# Patient Record
Sex: MALE | Race: WHITE | Employment: UNEMPLOYED | URBAN - METROPOLITAN AREA
[De-identification: names, ages, dates, MRNs, and addresses within clinical notes are randomized per-mention and may not be internally consistent; named-entity substitution may affect disease eponyms.]

---

## 2016-11-18 LAB
AVERAGE GLUCOSE: NORMAL
CREATININE, URINE: NORMAL
HBA1C MFR BLD: 7.1 %
MICROALBUMIN/CREAT 24H UR: NORMAL MG/G{CREAT}
MICROALBUMIN/CREAT UR-RTO: NORMAL

## 2018-07-03 LAB — POTASSIUM SERPL-SCNC: 4.6 MMOL/L (ref 3.5–5.1)

## 2018-10-28 ENCOUNTER — APPOINTMENT (OUTPATIENT)
Dept: GENERAL RADIOLOGY | Age: 58
DRG: 542 | End: 2018-10-28
Payer: MEDICARE

## 2018-10-28 ENCOUNTER — HOSPITAL ENCOUNTER (INPATIENT)
Age: 58
LOS: 5 days | Discharge: NURSING, REHAB OR LONG-TERM CARE | DRG: 542 | End: 2018-11-02
Attending: EMERGENCY MEDICINE | Admitting: INTERNAL MEDICINE
Payer: MEDICARE

## 2018-10-28 DIAGNOSIS — J18.9 PNEUMONIA DUE TO ORGANISM: ICD-10-CM

## 2018-10-28 DIAGNOSIS — J44.1 COPD EXACERBATION (HCC): Primary | ICD-10-CM

## 2018-10-28 DIAGNOSIS — R09.02 HYPOXIA: ICD-10-CM

## 2018-10-28 DIAGNOSIS — S32.018A OTHER CLOSED FRACTURE OF FIRST LUMBAR VERTEBRA, INITIAL ENCOUNTER (HCC): ICD-10-CM

## 2018-10-28 DIAGNOSIS — R00.0 TACHYCARDIA: ICD-10-CM

## 2018-10-28 PROBLEM — R06.02 SHORTNESS OF BREATH: Status: ACTIVE | Noted: 2018-10-28

## 2018-10-28 LAB
ALBUMIN SERPL-MCNC: 4 G/DL (ref 3.9–4.9)
ALP BLD-CCNC: 90 U/L (ref 35–104)
ALT SERPL-CCNC: 21 U/L (ref 0–41)
AMPHETAMINE SCREEN, URINE: NORMAL
ANION GAP SERPL CALCULATED.3IONS-SCNC: 13 MEQ/L (ref 7–13)
AST SERPL-CCNC: 45 U/L (ref 0–40)
BARBITURATE SCREEN URINE: NORMAL
BASOPHILS ABSOLUTE: 0 K/UL (ref 0–0.2)
BASOPHILS RELATIVE PERCENT: 0.3 %
BENZODIAZEPINE SCREEN, URINE: NORMAL
BILIRUB SERPL-MCNC: 0.5 MG/DL (ref 0–1.2)
BILIRUBIN URINE: NEGATIVE
BLOOD, URINE: NEGATIVE
BUN BLDV-MCNC: 17 MG/DL (ref 6–20)
CALCIUM SERPL-MCNC: 8.9 MG/DL (ref 8.6–10.2)
CANNABINOID SCREEN URINE: NORMAL
CHLORIDE BLD-SCNC: 89 MEQ/L (ref 98–107)
CLARITY: CLEAR
CO2: 30 MEQ/L (ref 22–29)
COCAINE METABOLITE SCREEN URINE: NORMAL
COLOR: YELLOW
CREAT SERPL-MCNC: 0.89 MG/DL (ref 0.7–1.2)
D DIMER: 0.3 MG/L FEU (ref 0–0.5)
EOSINOPHILS ABSOLUTE: 0 K/UL (ref 0–0.7)
EOSINOPHILS RELATIVE PERCENT: 0 %
ETHANOL PERCENT: NORMAL G/DL
ETHANOL: <10 MG/DL (ref 0–0.08)
GFR AFRICAN AMERICAN: >60
GFR NON-AFRICAN AMERICAN: >60
GLOBULIN: 2.5 G/DL (ref 2.3–3.5)
GLUCOSE BLD-MCNC: 287 MG/DL (ref 60–115)
GLUCOSE BLD-MCNC: 290 MG/DL (ref 60–115)
GLUCOSE BLD-MCNC: 341 MG/DL (ref 74–109)
GLUCOSE URINE: >=1000 MG/DL
HBA1C MFR BLD: 8.9 % (ref 4.8–5.9)
HCT VFR BLD CALC: 48 % (ref 42–52)
HEMOGLOBIN: 16.8 G/DL (ref 14–18)
KETONES, URINE: 15 MG/DL
LACTIC ACID: 2.2 MMOL/L (ref 0.5–2.2)
LEUKOCYTE ESTERASE, URINE: NEGATIVE
LYMPHOCYTES ABSOLUTE: 1.3 K/UL (ref 1–4.8)
LYMPHOCYTES RELATIVE PERCENT: 12.4 %
Lab: NORMAL
MAGNESIUM: 1.8 MG/DL (ref 1.7–2.3)
MCH RBC QN AUTO: 33.1 PG (ref 27–31.3)
MCHC RBC AUTO-ENTMCNC: 35 % (ref 33–37)
MCV RBC AUTO: 94.6 FL (ref 80–100)
MONOCYTES ABSOLUTE: 0.6 K/UL (ref 0.2–0.8)
MONOCYTES RELATIVE PERCENT: 5.4 %
NEUTROPHILS ABSOLUTE: 8.4 K/UL (ref 1.4–6.5)
NEUTROPHILS RELATIVE PERCENT: 81.9 %
NITRITE, URINE: NEGATIVE
OPIATE SCREEN URINE: NORMAL
PDW BLD-RTO: 14.2 % (ref 11.5–14.5)
PERFORMED ON: ABNORMAL
PERFORMED ON: ABNORMAL
PH UA: 5 (ref 5–9)
PHENCYCLIDINE SCREEN URINE: NORMAL
PLATELET # BLD: 153 K/UL (ref 130–400)
POTASSIUM SERPL-SCNC: 4.3 MEQ/L (ref 3.5–5.1)
PROTEIN UA: ABNORMAL MG/DL
RBC # BLD: 5.08 M/UL (ref 4.7–6.1)
SODIUM BLD-SCNC: 132 MEQ/L (ref 132–144)
SPECIFIC GRAVITY UA: 1.02 (ref 1–1.03)
TOTAL PROTEIN: 6.5 G/DL (ref 6.4–8.1)
TROPONIN: <0.01 NG/ML (ref 0–0.01)
URINE REFLEX TO CULTURE: ABNORMAL
UROBILINOGEN, URINE: 0.2 E.U./DL
WBC # BLD: 10.2 K/UL (ref 4.8–10.8)

## 2018-10-28 PROCEDURE — G0480 DRUG TEST DEF 1-7 CLASSES: HCPCS

## 2018-10-28 PROCEDURE — 99285 EMERGENCY DEPT VISIT HI MDM: CPT

## 2018-10-28 PROCEDURE — 1210000000 HC MED SURG R&B

## 2018-10-28 PROCEDURE — 6360000002 HC RX W HCPCS: Performed by: NURSE PRACTITIONER

## 2018-10-28 PROCEDURE — 94760 N-INVAS EAR/PLS OXIMETRY 1: CPT

## 2018-10-28 PROCEDURE — 96375 TX/PRO/DX INJ NEW DRUG ADDON: CPT

## 2018-10-28 PROCEDURE — 6370000000 HC RX 637 (ALT 250 FOR IP): Performed by: EMERGENCY MEDICINE

## 2018-10-28 PROCEDURE — 0HBRXZZ EXCISION OF TOE NAIL, EXTERNAL APPROACH: ICD-10-PCS | Performed by: INTERNAL MEDICINE

## 2018-10-28 PROCEDURE — 83036 HEMOGLOBIN GLYCOSYLATED A1C: CPT

## 2018-10-28 PROCEDURE — 80053 COMPREHEN METABOLIC PANEL: CPT

## 2018-10-28 PROCEDURE — 71045 X-RAY EXAM CHEST 1 VIEW: CPT

## 2018-10-28 PROCEDURE — 83605 ASSAY OF LACTIC ACID: CPT

## 2018-10-28 PROCEDURE — 2580000003 HC RX 258: Performed by: NURSE PRACTITIONER

## 2018-10-28 PROCEDURE — 6370000000 HC RX 637 (ALT 250 FOR IP): Performed by: NURSE PRACTITIONER

## 2018-10-28 PROCEDURE — 36415 COLL VENOUS BLD VENIPUNCTURE: CPT

## 2018-10-28 PROCEDURE — 72100 X-RAY EXAM L-S SPINE 2/3 VWS: CPT

## 2018-10-28 PROCEDURE — 72040 X-RAY EXAM NECK SPINE 2-3 VW: CPT

## 2018-10-28 PROCEDURE — 6360000002 HC RX W HCPCS: Performed by: EMERGENCY MEDICINE

## 2018-10-28 PROCEDURE — 94640 AIRWAY INHALATION TREATMENT: CPT

## 2018-10-28 PROCEDURE — 85025 COMPLETE CBC W/AUTO DIFF WBC: CPT

## 2018-10-28 PROCEDURE — 80307 DRUG TEST PRSMV CHEM ANLYZR: CPT

## 2018-10-28 PROCEDURE — 84484 ASSAY OF TROPONIN QUANT: CPT

## 2018-10-28 PROCEDURE — 81003 URINALYSIS AUTO W/O SCOPE: CPT

## 2018-10-28 PROCEDURE — 6360000002 HC RX W HCPCS: Performed by: INTERNAL MEDICINE

## 2018-10-28 PROCEDURE — 2580000003 HC RX 258: Performed by: EMERGENCY MEDICINE

## 2018-10-28 PROCEDURE — 72072 X-RAY EXAM THORAC SPINE 3VWS: CPT

## 2018-10-28 PROCEDURE — 87040 BLOOD CULTURE FOR BACTERIA: CPT

## 2018-10-28 PROCEDURE — 83735 ASSAY OF MAGNESIUM: CPT

## 2018-10-28 PROCEDURE — 94664 DEMO&/EVAL PT USE INHALER: CPT

## 2018-10-28 PROCEDURE — 2700000000 HC OXYGEN THERAPY PER DAY

## 2018-10-28 PROCEDURE — 96365 THER/PROPH/DIAG IV INF INIT: CPT

## 2018-10-28 PROCEDURE — 85379 FIBRIN DEGRADATION QUANT: CPT

## 2018-10-28 RX ORDER — LIDOCAINE 4 G/G
1 PATCH TOPICAL DAILY
Status: DISCONTINUED | OUTPATIENT
Start: 2018-10-28 | End: 2018-10-28

## 2018-10-28 RX ORDER — ALBUTEROL SULFATE 2.5 MG/3ML
2.5 SOLUTION RESPIRATORY (INHALATION) 3 TIMES DAILY
Status: DISCONTINUED | OUTPATIENT
Start: 2018-10-28 | End: 2018-10-29

## 2018-10-28 RX ORDER — DEXTROSE MONOHYDRATE 50 MG/ML
100 INJECTION, SOLUTION INTRAVENOUS PRN
Status: DISCONTINUED | OUTPATIENT
Start: 2018-10-28 | End: 2018-11-02 | Stop reason: HOSPADM

## 2018-10-28 RX ORDER — ONDANSETRON 2 MG/ML
4 INJECTION INTRAMUSCULAR; INTRAVENOUS EVERY 6 HOURS PRN
Status: DISCONTINUED | OUTPATIENT
Start: 2018-10-28 | End: 2018-11-02 | Stop reason: HOSPADM

## 2018-10-28 RX ORDER — ALBUTEROL SULFATE 2.5 MG/3ML
2.5 SOLUTION RESPIRATORY (INHALATION)
Status: DISCONTINUED | OUTPATIENT
Start: 2018-10-28 | End: 2018-11-02 | Stop reason: HOSPADM

## 2018-10-28 RX ORDER — SODIUM CHLORIDE 9 MG/ML
INJECTION, SOLUTION INTRAVENOUS CONTINUOUS
Status: DISCONTINUED | OUTPATIENT
Start: 2018-10-28 | End: 2018-11-02 | Stop reason: HOSPADM

## 2018-10-28 RX ORDER — ALBUTEROL SULFATE 2.5 MG/3ML
2.5 SOLUTION RESPIRATORY (INHALATION) EVERY 6 HOURS
Status: DISCONTINUED | OUTPATIENT
Start: 2018-10-28 | End: 2018-10-28

## 2018-10-28 RX ORDER — NICOTINE 21 MG/24HR
1 PATCH, TRANSDERMAL 24 HOURS TRANSDERMAL DAILY
Status: DISCONTINUED | OUTPATIENT
Start: 2018-10-28 | End: 2018-11-02 | Stop reason: HOSPADM

## 2018-10-28 RX ORDER — NICOTINE POLACRILEX 4 MG
15 LOZENGE BUCCAL PRN
Status: DISCONTINUED | OUTPATIENT
Start: 2018-10-28 | End: 2018-11-02 | Stop reason: HOSPADM

## 2018-10-28 RX ORDER — LEVOFLOXACIN 500 MG/1
500 TABLET, FILM COATED ORAL DAILY
Status: DISCONTINUED | OUTPATIENT
Start: 2018-10-29 | End: 2018-10-29

## 2018-10-28 RX ORDER — DEXTROSE MONOHYDRATE 25 G/50ML
12.5 INJECTION, SOLUTION INTRAVENOUS PRN
Status: DISCONTINUED | OUTPATIENT
Start: 2018-10-28 | End: 2018-11-02 | Stop reason: HOSPADM

## 2018-10-28 RX ORDER — IBUPROFEN 400 MG/1
400 TABLET ORAL EVERY 6 HOURS PRN
Status: DISCONTINUED | OUTPATIENT
Start: 2018-10-28 | End: 2018-11-02 | Stop reason: HOSPADM

## 2018-10-28 RX ORDER — PREDNISONE 20 MG/1
40 TABLET ORAL DAILY
Status: DISCONTINUED | OUTPATIENT
Start: 2018-10-28 | End: 2018-10-28

## 2018-10-28 RX ORDER — SODIUM CHLORIDE 0.9 % (FLUSH) 0.9 %
10 SYRINGE (ML) INJECTION PRN
Status: DISCONTINUED | OUTPATIENT
Start: 2018-10-28 | End: 2018-11-02 | Stop reason: HOSPADM

## 2018-10-28 RX ORDER — TRAMADOL HYDROCHLORIDE 50 MG/1
50 TABLET ORAL EVERY 6 HOURS PRN
Status: DISCONTINUED | OUTPATIENT
Start: 2018-10-28 | End: 2018-10-28

## 2018-10-28 RX ORDER — SODIUM CHLORIDE 0.9 % (FLUSH) 0.9 %
10 SYRINGE (ML) INJECTION EVERY 12 HOURS SCHEDULED
Status: DISCONTINUED | OUTPATIENT
Start: 2018-10-28 | End: 2018-11-02 | Stop reason: HOSPADM

## 2018-10-28 RX ORDER — 0.9 % SODIUM CHLORIDE 0.9 %
1000 INTRAVENOUS SOLUTION INTRAVENOUS ONCE
Status: COMPLETED | OUTPATIENT
Start: 2018-10-28 | End: 2018-10-28

## 2018-10-28 RX ORDER — IPRATROPIUM BROMIDE AND ALBUTEROL SULFATE 2.5; .5 MG/3ML; MG/3ML
1 SOLUTION RESPIRATORY (INHALATION) PRN
Status: DISCONTINUED | OUTPATIENT
Start: 2018-10-28 | End: 2018-10-28

## 2018-10-28 RX ORDER — PREDNISONE 20 MG/1
40 TABLET ORAL DAILY
Status: DISCONTINUED | OUTPATIENT
Start: 2018-10-29 | End: 2018-10-29

## 2018-10-28 RX ORDER — ALBUTEROL SULFATE 2.5 MG/3ML
2.5 SOLUTION RESPIRATORY (INHALATION) EVERY 6 HOURS PRN
Status: DISCONTINUED | OUTPATIENT
Start: 2018-10-28 | End: 2018-10-28

## 2018-10-28 RX ORDER — LEVOFLOXACIN 5 MG/ML
750 INJECTION, SOLUTION INTRAVENOUS ONCE
Status: COMPLETED | OUTPATIENT
Start: 2018-10-28 | End: 2018-10-28

## 2018-10-28 RX ORDER — METHYLPREDNISOLONE SODIUM SUCCINATE 125 MG/2ML
125 INJECTION, POWDER, LYOPHILIZED, FOR SOLUTION INTRAMUSCULAR; INTRAVENOUS ONCE
Status: COMPLETED | OUTPATIENT
Start: 2018-10-28 | End: 2018-10-28

## 2018-10-28 RX ADMIN — LEVOFLOXACIN 750 MG: 5 INJECTION, SOLUTION INTRAVENOUS at 13:19

## 2018-10-28 RX ADMIN — ALBUTEROL SULFATE 2.5 MG: 2.5 SOLUTION RESPIRATORY (INHALATION) at 15:07

## 2018-10-28 RX ADMIN — IPRATROPIUM BROMIDE AND ALBUTEROL SULFATE 1 AMPULE: .5; 3 SOLUTION RESPIRATORY (INHALATION) at 12:18

## 2018-10-28 RX ADMIN — METHYLPREDNISOLONE SODIUM SUCCINATE 125 MG: 125 INJECTION, POWDER, FOR SOLUTION INTRAMUSCULAR; INTRAVENOUS at 12:46

## 2018-10-28 RX ADMIN — ENOXAPARIN SODIUM 40 MG: 40 INJECTION SUBCUTANEOUS at 15:29

## 2018-10-28 RX ADMIN — SODIUM CHLORIDE 1000 ML: 9 INJECTION, SOLUTION INTRAVENOUS at 12:50

## 2018-10-28 RX ADMIN — IBUPROFEN 400 MG: 400 TABLET ORAL at 21:43

## 2018-10-28 RX ADMIN — INSULIN LISPRO 3 UNITS: 100 INJECTION, SOLUTION INTRAVENOUS; SUBCUTANEOUS at 17:17

## 2018-10-28 RX ADMIN — SODIUM CHLORIDE: 9 INJECTION, SOLUTION INTRAVENOUS at 15:29

## 2018-10-28 RX ADMIN — ALBUTEROL SULFATE 2.5 MG: 2.5 SOLUTION RESPIRATORY (INHALATION) at 20:54

## 2018-10-28 ASSESSMENT — PAIN DESCRIPTION - LOCATION: LOCATION: BACK

## 2018-10-28 ASSESSMENT — PAIN SCALES - GENERAL
PAINLEVEL_OUTOF10: 8
PAINLEVEL_OUTOF10: 2
PAINLEVEL_OUTOF10: 0

## 2018-10-28 ASSESSMENT — PAIN DESCRIPTION - ORIENTATION: ORIENTATION: RIGHT;MID

## 2018-10-28 ASSESSMENT — PAIN DESCRIPTION - PAIN TYPE: TYPE: CHRONIC PAIN

## 2018-10-28 ASSESSMENT — PAIN DESCRIPTION - DESCRIPTORS: DESCRIPTORS: SHARP

## 2018-10-28 NOTE — PROGRESS NOTES
[facility] AT [place] Respiratory Therapy Evaluation   Current Order:  Q6 albuterol     Home Regimen: prn albuterol     Ordering Physician:Tobin  Re-evaluation Date:  10/31    Diagnosis: SOB     Patient Status: Stable / Unstable + Physician notified    The following MDI Criteria must be met in order to convert aerosol to MDI with spacer. If unable to meet, MDI will be converted to aerosol:  []  Patient able to demonstrate the ability to use MDI effectively  []  Patient alert and cooperative  []  Patient able to take deep breath with 5-10 second hold  []  Medication(s) available in this delivery method   []  Peak flow greater than or equal to 200 ml/min            Current Order Substituted To  (same drug, same frequency)   Aerosol to MDI [] Albuterol Sulfate 0.083% unit dose by aerosol Albuterol Sulfate MDI 2 puffs by inhalation with spacer    [] Levalbuterol 1.25 mg unit dose by aerosol Levalbuterol MDI 2 puffs by inhalation with spacer    [] Levalbuterol 0.63 mg unit dose by aerosol Levalbuterol MDI 2 puffs by inhalation with spacer    [] Ipratropium Bromide 0.02% unit dose by aerosol Ipratropium Bromide MDI 2 puffs by inhalation with spacer    [] Duoneb (Ipratropium + Albuterol) unit dose by aerosol Ipratropium MDI + Albuterol MDI 2 puffs by inhalation w/spacer   MDI to Aerosol [] Albuterol Sulfate MDI Albuterol Sulfate 0.083% unit dose by aerosol    [] Levalbuterol MDI 2 puffs by inhalation Levalbuterol 1.25 mg unit dose by aerosol    [] Ipratropium Bromide MDI by inhalation Ipratropium Bromide 0.02% unit dose by aerosol    [] Combivent (Ipratropium + Albuterol) MDI by inhalation Duoneb (Ipratropium + Albuterol) unit dose by aerosol   Treatment Assessment [Frequency/Schedule]:  Change frequency to: __________TID albuterol________________________________________per Protocol, P&T, MEC      Points 0 1 2 3 4   Pulmonary Status  Non-Smoker  []   Smoking history   < 20 pack years  []   Smoking history  ?  20 pack years  [] Pulmonary Disorder  (acute or chronic)  [x]   Severe or Chronic w/ Exacerbation  []     Surgical Status No [x]   Surgeries     General []   Surgery Lower []   Abdominal Thoracic or []   Upper Abdominal Thoracic with  PulmonaryDisorder  []     Chest X-ray Clear/Not  Ordered     []  Chronic Changes  Results Pending  []  Infiltrates, atelectasis, pleural effusion, or edema  [x]  Infiltrates in more than one lobe []  Infiltrate + Atelectasis, &/or pleural effusion  []    Respiratory Pattern Regular,  RR = 12-20 [x]  Increased,  RR = 21-25 []  TRUONG, irregular,  or RR = 26-30 []  Decreased FEV1  or RR = 31-35 []  Severe SOB, use  of accessory muscles, or RR ? 35  []    Mental Status Alert, oriented,  Cooperative [x]  Confused but Follows commands []  Lethargic or unable to follow commands []  Obtunded  []  Comatose  []    Breath Sounds Clear to  auscultation  []  Decreased unilaterally or  in bases only [x]  Decreased  bilaterally  []  Crackles or intermittent wheezes []  Wheezes []    Cough Strong, Spontan., & nonproductive [x]  Strong,  spontaneous, &  productive []  Weak,  Nonproductive []  Weak, productive or  with wheezes []  No spontaneous  cough or may require suctioning []    Level of Activity Ambulatory [x]  Ambulatory w/ Assist  []  Non-ambulatory []  Paraplegic []  Quadriplegic []    Total    Score:___6____     Triage Score:___4_____      Tri       Triage:     1. (>20) Freq: Q3    2. (16-20) Freq: Q4   3. (11-15) Freq: QID & Albuterol Q2 PRN    4. (6-10) Freq: TID & Albuterol Q2 PRN    5. (0-5) Freq Q4prn

## 2018-10-28 NOTE — ED NOTES
Pt offered food. Pt states there are foods he is not supposed to eat. PT states he usually eats pudding. Given pudding.  Pt's O2 increased to 4l nc, [facility]  10/28/18 1327

## 2018-10-29 LAB
ANION GAP SERPL CALCULATED.3IONS-SCNC: 13 MEQ/L (ref 7–13)
BASOPHILS ABSOLUTE: 0 K/UL (ref 0–0.2)
BASOPHILS RELATIVE PERCENT: 0.2 %
BUN BLDV-MCNC: 13 MG/DL (ref 6–20)
CALCIUM SERPL-MCNC: 8.5 MG/DL (ref 8.6–10.2)
CHLORIDE BLD-SCNC: 97 MEQ/L (ref 98–107)
CHOLESTEROL, TOTAL: 143 MG/DL (ref 0–199)
CK MB: 5.9 NG/ML (ref 0–6.7)
CO2: 25 MEQ/L (ref 22–29)
CREAT SERPL-MCNC: 0.89 MG/DL (ref 0.7–1.2)
CREATINE KINASE-MB INDEX: 1.4 % (ref 0–3.5)
EOSINOPHILS ABSOLUTE: 0 K/UL (ref 0–0.7)
EOSINOPHILS RELATIVE PERCENT: 0 %
GFR AFRICAN AMERICAN: >60
GFR NON-AFRICAN AMERICAN: >60
GLUCOSE BLD-MCNC: 152 MG/DL (ref 60–115)
GLUCOSE BLD-MCNC: 191 MG/DL (ref 60–115)
GLUCOSE BLD-MCNC: 261 MG/DL (ref 60–115)
GLUCOSE BLD-MCNC: 280 MG/DL (ref 60–115)
GLUCOSE BLD-MCNC: 314 MG/DL (ref 74–109)
HCT VFR BLD CALC: 44.2 % (ref 42–52)
HDLC SERPL-MCNC: 43 MG/DL (ref 40–59)
HEMOGLOBIN: 15.3 G/DL (ref 14–18)
INR BLD: 1.1
LDL CHOLESTEROL CALCULATED: 54 MG/DL (ref 0–129)
LV EF: 55 %
LVEF MODALITY: NORMAL
LYMPHOCYTES ABSOLUTE: 0.6 K/UL (ref 1–4.8)
LYMPHOCYTES RELATIVE PERCENT: 5.7 %
MCH RBC QN AUTO: 33.3 PG (ref 27–31.3)
MCHC RBC AUTO-ENTMCNC: 34.7 % (ref 33–37)
MCV RBC AUTO: 95.8 FL (ref 80–100)
MONOCYTES ABSOLUTE: 0.6 K/UL (ref 0.2–0.8)
MONOCYTES RELATIVE PERCENT: 5.3 %
NEUTROPHILS ABSOLUTE: 9.4 K/UL (ref 1.4–6.5)
NEUTROPHILS RELATIVE PERCENT: 88.8 %
PDW BLD-RTO: 14.1 % (ref 11.5–14.5)
PERFORMED ON: ABNORMAL
PLATELET # BLD: 144 K/UL (ref 130–400)
POTASSIUM REFLEX MAGNESIUM: 4.5 MEQ/L (ref 3.5–5.1)
PROCALCITONIN: 0.22 NG/ML (ref 0–0.15)
PROTHROMBIN TIME: 11.3 SEC (ref 9.6–12.3)
RBC # BLD: 4.61 M/UL (ref 4.7–6.1)
SODIUM BLD-SCNC: 135 MEQ/L (ref 132–144)
TOTAL CK: 417 U/L (ref 0–190)
TRIGL SERPL-MCNC: 231 MG/DL (ref 0–200)
TROPONIN: <0.01 NG/ML (ref 0–0.01)
WBC # BLD: 10.6 K/UL (ref 4.8–10.8)

## 2018-10-29 PROCEDURE — 36415 COLL VENOUS BLD VENIPUNCTURE: CPT

## 2018-10-29 PROCEDURE — 6370000000 HC RX 637 (ALT 250 FOR IP): Performed by: NURSE PRACTITIONER

## 2018-10-29 PROCEDURE — 6360000002 HC RX W HCPCS: Performed by: NURSE PRACTITIONER

## 2018-10-29 PROCEDURE — 82550 ASSAY OF CK (CPK): CPT

## 2018-10-29 PROCEDURE — G8978 MOBILITY CURRENT STATUS: HCPCS

## 2018-10-29 PROCEDURE — 80048 BASIC METABOLIC PNL TOTAL CA: CPT

## 2018-10-29 PROCEDURE — 97162 PT EVAL MOD COMPLEX 30 MIN: CPT

## 2018-10-29 PROCEDURE — 99222 1ST HOSP IP/OBS MODERATE 55: CPT | Performed by: INTERNAL MEDICINE

## 2018-10-29 PROCEDURE — 6370000000 HC RX 637 (ALT 250 FOR IP): Performed by: INTERNAL MEDICINE

## 2018-10-29 PROCEDURE — APPNB45 APP NON BILLABLE 31-45 MINUTES: Performed by: NURSE PRACTITIONER

## 2018-10-29 PROCEDURE — 82553 CREATINE MB FRACTION: CPT

## 2018-10-29 PROCEDURE — 85025 COMPLETE CBC W/AUTO DIFF WBC: CPT

## 2018-10-29 PROCEDURE — 2580000003 HC RX 258: Performed by: NURSE PRACTITIONER

## 2018-10-29 PROCEDURE — 93306 TTE W/DOPPLER COMPLETE: CPT

## 2018-10-29 PROCEDURE — 84145 PROCALCITONIN (PCT): CPT

## 2018-10-29 PROCEDURE — G8979 MOBILITY GOAL STATUS: HCPCS

## 2018-10-29 PROCEDURE — 87449 NOS EACH ORGANISM AG IA: CPT

## 2018-10-29 PROCEDURE — 94640 AIRWAY INHALATION TREATMENT: CPT

## 2018-10-29 PROCEDURE — 84484 ASSAY OF TROPONIN QUANT: CPT

## 2018-10-29 PROCEDURE — 1210000000 HC MED SURG R&B

## 2018-10-29 PROCEDURE — 93005 ELECTROCARDIOGRAM TRACING: CPT

## 2018-10-29 PROCEDURE — 80061 LIPID PANEL: CPT

## 2018-10-29 PROCEDURE — 2700000000 HC OXYGEN THERAPY PER DAY

## 2018-10-29 PROCEDURE — 85610 PROTHROMBIN TIME: CPT

## 2018-10-29 RX ORDER — ASPIRIN 81 MG/1
81 TABLET, CHEWABLE ORAL DAILY
Status: DISCONTINUED | OUTPATIENT
Start: 2018-10-29 | End: 2018-11-01

## 2018-10-29 RX ORDER — LISINOPRIL 2.5 MG/1
2.5 TABLET ORAL DAILY
Status: DISCONTINUED | OUTPATIENT
Start: 2018-10-29 | End: 2018-11-02 | Stop reason: HOSPADM

## 2018-10-29 RX ORDER — IPRATROPIUM BROMIDE AND ALBUTEROL SULFATE 2.5; .5 MG/3ML; MG/3ML
1 SOLUTION RESPIRATORY (INHALATION) 3 TIMES DAILY
Status: DISCONTINUED | OUTPATIENT
Start: 2018-10-29 | End: 2018-11-02 | Stop reason: HOSPADM

## 2018-10-29 RX ADMIN — METFORMIN HYDROCHLORIDE 500 MG: 500 TABLET ORAL at 12:33

## 2018-10-29 RX ADMIN — PREDNISONE 40 MG: 20 TABLET ORAL at 09:20

## 2018-10-29 RX ADMIN — LINAGLIPTIN 5 MG: 5 TABLET, FILM COATED ORAL at 12:33

## 2018-10-29 RX ADMIN — DILTIAZEM HYDROCHLORIDE 30 MG: 30 TABLET, FILM COATED ORAL at 20:28

## 2018-10-29 RX ADMIN — DILTIAZEM HYDROCHLORIDE 30 MG: 30 TABLET, FILM COATED ORAL at 16:17

## 2018-10-29 RX ADMIN — INSULIN LISPRO 1 UNITS: 100 INJECTION, SOLUTION INTRAVENOUS; SUBCUTANEOUS at 12:33

## 2018-10-29 RX ADMIN — Medication 10 ML: at 20:29

## 2018-10-29 RX ADMIN — LEVOFLOXACIN 500 MG: 500 TABLET, FILM COATED ORAL at 09:20

## 2018-10-29 RX ADMIN — INSULIN LISPRO 3 UNITS: 100 INJECTION, SOLUTION INTRAVENOUS; SUBCUTANEOUS at 09:22

## 2018-10-29 RX ADMIN — METFORMIN HYDROCHLORIDE 500 MG: 500 TABLET ORAL at 18:01

## 2018-10-29 RX ADMIN — ASPIRIN 81 MG: 81 TABLET, CHEWABLE ORAL at 12:33

## 2018-10-29 RX ADMIN — LISINOPRIL 2.5 MG: 2.5 TABLET ORAL at 12:33

## 2018-10-29 RX ADMIN — ENOXAPARIN SODIUM 40 MG: 40 INJECTION SUBCUTANEOUS at 09:20

## 2018-10-29 RX ADMIN — IPRATROPIUM BROMIDE AND ALBUTEROL SULFATE 1 AMPULE: 2.5; .5 SOLUTION RESPIRATORY (INHALATION) at 19:15

## 2018-10-29 RX ADMIN — INSULIN LISPRO 3 UNITS: 100 INJECTION, SOLUTION INTRAVENOUS; SUBCUTANEOUS at 17:46

## 2018-10-29 ASSESSMENT — PAIN SCALES - GENERAL
PAINLEVEL_OUTOF10: 0
PAINLEVEL_OUTOF10: 2
PAINLEVEL_OUTOF10: 0
PAINLEVEL_OUTOF10: 1

## 2018-10-29 ASSESSMENT — ENCOUNTER SYMPTOMS
SHORTNESS OF BREATH: 1
APNEA: 0
CHOKING: 0
ALLERGIC/IMMUNOLOGIC NEGATIVE: 1
SPUTUM PRODUCTION: 0
COUGH: 1
CONSTIPATION: 0
COUGH: 0
CHEST TIGHTNESS: 0
STRIDOR: 0
WHEEZING: 0
ABDOMINAL PAIN: 0
NAUSEA: 0
GASTROINTESTINAL NEGATIVE: 1
EYES NEGATIVE: 1
DIARRHEA: 0
VOMITING: 0
ORTHOPNEA: 0

## 2018-10-30 LAB
ANION GAP SERPL CALCULATED.3IONS-SCNC: 11 MEQ/L (ref 7–13)
BASOPHILS ABSOLUTE: 0 K/UL (ref 0–0.2)
BASOPHILS RELATIVE PERCENT: 0.3 %
BUN BLDV-MCNC: 18 MG/DL (ref 6–20)
CALCIUM SERPL-MCNC: 8.6 MG/DL (ref 8.6–10.2)
CHLORIDE BLD-SCNC: 94 MEQ/L (ref 98–107)
CO2: 27 MEQ/L (ref 22–29)
CREAT SERPL-MCNC: 0.98 MG/DL (ref 0.7–1.2)
EOSINOPHILS ABSOLUTE: 0 K/UL (ref 0–0.7)
EOSINOPHILS RELATIVE PERCENT: 0.2 %
GFR AFRICAN AMERICAN: >60
GFR NON-AFRICAN AMERICAN: >60
GLUCOSE BLD-MCNC: 137 MG/DL (ref 60–115)
GLUCOSE BLD-MCNC: 148 MG/DL (ref 60–115)
GLUCOSE BLD-MCNC: 157 MG/DL (ref 74–109)
GLUCOSE BLD-MCNC: 181 MG/DL (ref 60–115)
GLUCOSE BLD-MCNC: 230 MG/DL (ref 60–115)
HCT VFR BLD CALC: 44.4 % (ref 42–52)
HEMOGLOBIN: 15.3 G/DL (ref 14–18)
LYMPHOCYTES ABSOLUTE: 2.3 K/UL (ref 1–4.8)
LYMPHOCYTES RELATIVE PERCENT: 20.9 %
MCH RBC QN AUTO: 33 PG (ref 27–31.3)
MCHC RBC AUTO-ENTMCNC: 34.5 % (ref 33–37)
MCV RBC AUTO: 95.6 FL (ref 80–100)
MONOCYTES ABSOLUTE: 0.7 K/UL (ref 0.2–0.8)
MONOCYTES RELATIVE PERCENT: 6.5 %
NEUTROPHILS ABSOLUTE: 7.8 K/UL (ref 1.4–6.5)
NEUTROPHILS RELATIVE PERCENT: 72.1 %
PDW BLD-RTO: 14.3 % (ref 11.5–14.5)
PERFORMED ON: ABNORMAL
PLATELET # BLD: 139 K/UL (ref 130–400)
POTASSIUM SERPL-SCNC: 3.7 MEQ/L (ref 3.5–5.1)
RBC # BLD: 4.64 M/UL (ref 4.7–6.1)
SODIUM BLD-SCNC: 132 MEQ/L (ref 132–144)
WBC # BLD: 10.9 K/UL (ref 4.8–10.8)

## 2018-10-30 PROCEDURE — 2700000000 HC OXYGEN THERAPY PER DAY

## 2018-10-30 PROCEDURE — 99232 SBSQ HOSP IP/OBS MODERATE 35: CPT | Performed by: INTERNAL MEDICINE

## 2018-10-30 PROCEDURE — 6370000000 HC RX 637 (ALT 250 FOR IP): Performed by: INTERNAL MEDICINE

## 2018-10-30 PROCEDURE — 93005 ELECTROCARDIOGRAM TRACING: CPT

## 2018-10-30 PROCEDURE — 93010 ELECTROCARDIOGRAM REPORT: CPT | Performed by: INTERNAL MEDICINE

## 2018-10-30 PROCEDURE — 6360000002 HC RX W HCPCS: Performed by: INTERNAL MEDICINE

## 2018-10-30 PROCEDURE — 6370000000 HC RX 637 (ALT 250 FOR IP): Performed by: NURSE PRACTITIONER

## 2018-10-30 PROCEDURE — 94640 AIRWAY INHALATION TREATMENT: CPT

## 2018-10-30 PROCEDURE — 80048 BASIC METABOLIC PNL TOTAL CA: CPT

## 2018-10-30 PROCEDURE — 36415 COLL VENOUS BLD VENIPUNCTURE: CPT

## 2018-10-30 PROCEDURE — 2580000003 HC RX 258: Performed by: NURSE PRACTITIONER

## 2018-10-30 PROCEDURE — 85025 COMPLETE CBC W/AUTO DIFF WBC: CPT

## 2018-10-30 PROCEDURE — 6360000002 HC RX W HCPCS: Performed by: NURSE PRACTITIONER

## 2018-10-30 PROCEDURE — 97116 GAIT TRAINING THERAPY: CPT

## 2018-10-30 PROCEDURE — 99223 1ST HOSP IP/OBS HIGH 75: CPT | Performed by: INTERNAL MEDICINE

## 2018-10-30 PROCEDURE — 1210000000 HC MED SURG R&B

## 2018-10-30 RX ORDER — LEVOFLOXACIN 5 MG/ML
750 INJECTION, SOLUTION INTRAVENOUS EVERY 24 HOURS
Status: DISCONTINUED | OUTPATIENT
Start: 2018-10-30 | End: 2018-10-31

## 2018-10-30 RX ADMIN — Medication 10 ML: at 09:28

## 2018-10-30 RX ADMIN — DILTIAZEM HYDROCHLORIDE 30 MG: 30 TABLET, FILM COATED ORAL at 20:40

## 2018-10-30 RX ADMIN — METFORMIN HYDROCHLORIDE 500 MG: 500 TABLET ORAL at 16:51

## 2018-10-30 RX ADMIN — IPRATROPIUM BROMIDE AND ALBUTEROL SULFATE 1 AMPULE: 2.5; .5 SOLUTION RESPIRATORY (INHALATION) at 14:29

## 2018-10-30 RX ADMIN — IPRATROPIUM BROMIDE AND ALBUTEROL SULFATE 1 AMPULE: 2.5; .5 SOLUTION RESPIRATORY (INHALATION) at 19:09

## 2018-10-30 RX ADMIN — LEVOFLOXACIN 750 MG: 5 INJECTION, SOLUTION INTRAVENOUS at 12:44

## 2018-10-30 RX ADMIN — LINAGLIPTIN 5 MG: 5 TABLET, FILM COATED ORAL at 09:27

## 2018-10-30 RX ADMIN — METFORMIN HYDROCHLORIDE 500 MG: 500 TABLET ORAL at 09:24

## 2018-10-30 RX ADMIN — LISINOPRIL 2.5 MG: 2.5 TABLET ORAL at 09:24

## 2018-10-30 RX ADMIN — ENOXAPARIN SODIUM 40 MG: 40 INJECTION SUBCUTANEOUS at 09:24

## 2018-10-30 RX ADMIN — DILTIAZEM HYDROCHLORIDE 30 MG: 30 TABLET, FILM COATED ORAL at 05:31

## 2018-10-30 RX ADMIN — INSULIN LISPRO 2 UNITS: 100 INJECTION, SOLUTION INTRAVENOUS; SUBCUTANEOUS at 12:25

## 2018-10-30 RX ADMIN — IPRATROPIUM BROMIDE AND ALBUTEROL SULFATE 1 AMPULE: 2.5; .5 SOLUTION RESPIRATORY (INHALATION) at 07:52

## 2018-10-30 RX ADMIN — IBUPROFEN 400 MG: 400 TABLET ORAL at 01:18

## 2018-10-30 RX ADMIN — ASPIRIN 81 MG: 81 TABLET, CHEWABLE ORAL at 09:24

## 2018-10-30 RX ADMIN — Medication 10 ML: at 20:41

## 2018-10-30 ASSESSMENT — ENCOUNTER SYMPTOMS
EYES NEGATIVE: 1
VOMITING: 0
ORTHOPNEA: 0
SPUTUM PRODUCTION: 0
CHEST TIGHTNESS: 0
RESPIRATORY NEGATIVE: 1
SHORTNESS OF BREATH: 1
GASTROINTESTINAL NEGATIVE: 1
STRIDOR: 0
COUGH: 0
DIARRHEA: 0
SHORTNESS OF BREATH: 0
BLOOD IN STOOL: 0
WHEEZING: 0
ABDOMINAL PAIN: 0
NAUSEA: 0
COUGH: 1
BACK PAIN: 1
CONSTIPATION: 0

## 2018-10-30 ASSESSMENT — PAIN SCALES - GENERAL
PAINLEVEL_OUTOF10: 0
PAINLEVEL_OUTOF10: 2
PAINLEVEL_OUTOF10: 2

## 2018-10-30 ASSESSMENT — PAIN DESCRIPTION - LOCATION: LOCATION: BACK

## 2018-10-30 ASSESSMENT — PAIN DESCRIPTION - PAIN TYPE: TYPE: CHRONIC PAIN

## 2018-10-30 NOTE — CONSULTS
0. 98   GLUCOSE  341*  314*  157*       MV Settings: ABGs: No results for input(s): PHART, CNM1XFJ, PO2ART, WES7EWU, BEART, L9QMEXYX, JRP3AEN in the last 72 hours. O2 Device: Nasal cannula  O2 Flow Rate (L/min): 3 L/min  Lab Results   Component Value Date    LACTA 2.2 10/28/2018       Radiology  I personally reviewed imaging studies and Chest x-ray shows increased haziness left lower lobe. Echo   There is mild ( 1 +) tricuspid regurgitation with estimated RVSP of 42 mm   Hg.   Normal pulmonic valve structure and function.   Normal left atrium.   Left ventricular ejection fraction is visually estimated at 55%.   Impaired relaxation compatible with diastolic dysfunction. ( reversed E/A   ratio)    Assessment, plan:   Patient is at risk due to    · Dyspnea on exertion secondary to pneumonia along with obstructive airway disease. In light of his psychiatric underlying illness compliance with diagnostic procedure can be limited.   · Abnormal chest x-ray with left lower lobe haziness with increased pro-calcitonin concerning for pneumonia  · Smoking  · History of PE  · Diastolic dysfunction  · Pulmonary hypertension secondary to diastolic dysfunction and lung disease      Recommendation  · DuoNeb's every 6 hours while awake  · Budesonide nebs  · Start levofloxacin  · Chest x-ray tomorrow  · avoid volume overload  · IS     Thank you for consultation    Electronically signed by Ledy Vargas MD, [facility],  on 10/30/2018 at 12:04 PM
PROT 6.5 10/28/2018    LABALBU 4.0 10/28/2018    LABALBU 4.3 09/17/2011    CALCIUM 8.5 10/29/2018    BILITOT 0.5 10/28/2018    ALKPHOS 90 10/28/2018    AST 45 10/28/2018    ALT 21 10/28/2018     BMP:    Lab Results   Component Value Date     10/29/2018    K 4.5 10/29/2018    CL 97 10/29/2018    CO2 25 10/29/2018    BUN 13 10/29/2018    LABALBU 4.0 10/28/2018    LABALBU 4.3 09/17/2011    CREATININE 0.89 10/29/2018    CALCIUM 8.5 10/29/2018    GFRAA >60.0 10/29/2018    LABGLOM >60.0 10/29/2018    GLUCOSE 314 10/29/2018    GLUCOSE 89 05/29/2012     Magnesium:    Lab Results   Component Value Date    MG 1.8 10/28/2018     Troponin:    Lab Results   Component Value Date    TROPONINI <0.010 10/28/2018       EKG: sinus tachycardia      Assessment:    Active Hospital Problems    Diagnosis Date Noted    Shortness of breath [R06.02] 10/28/2018      pneumonia  SOB-stable  schrizophrenia  HTN  Tobacco abuse    Plan:  1. Tele monitoring  2. Check 2D Echo for LV function, PA pressures, wall motion abnormalities and any significant valvular disease. 3. cardizem 30 mg q8  4. Monitor magnesium levels and keep greater 2.0  5.  Monitor potassium levels and keep greater than 4.0            Electronically signed by DOROTHY Cabral CNP on 10/29/2018 at 10:26 AM

## 2018-10-30 NOTE — PROGRESS NOTES
Progress Note  Patient: Carlton Villalobos  Unit/Bed: F054/Y512-87  YOB: 1960  MRN: [id number]  Acct: [de-identified]   Admitting Diagnosis: Shortness of breath [R06.02]  Admit Date:  10/28/2018  Hospital Day: 2    Chief Complaint:sob back pain    Histories:  Past Medical History:   Diagnosis Date    Anxiety     Asthma     [facility] ([facility]     History of pulmonary embolism     History of seizures     Long term (current) use of anticoagulants 5/12/2015    Schizophrenia ([facility]     SOB (shortness of breath) 11/17/2016    Tobacco abuse      Past Surgical History:   Procedure Laterality Date    APPENDECTOMY       History reviewed. No pertinent family history. Social History     Social History    Marital status: Single     Spouse name: N/A    Number of children: N/A    Years of education: N/A     Social History Main Topics    Smoking status: Current Every Day Smoker     Packs/day: 1.00     Years: 14.00     Types: Cigarettes    Smokeless tobacco: Never Used    Alcohol use No    Drug use: No    Sexual activity: Not Asked     Other Topics Concern    None     Social History Narrative    None       Subjective/HPI: much more comprehensible today. breathing is better tday. Back still hurts. Washed up in room went to bathroom ate ok    EKG:        Review of Systems:   Review of Systems   Constitutional: Negative. Negative for diaphoresis and fatigue. HENT: Negative. Eyes: Negative. Respiratory: Negative. Negative for cough, chest tightness, shortness of breath, wheezing and stridor. Cardiovascular: Negative. Negative for chest pain, palpitations and leg swelling. Gastrointestinal: Negative. Negative for blood in stool and nausea. Genitourinary: Negative. Musculoskeletal: Positive for arthralgias and back pain. Skin: Negative. Neurological: Negative. Negative for dizziness, syncope, weakness and light-headedness. Hematological: Negative. 10/29/2018    CL 94 10/30/2018    CO2 27 10/30/2018    BUN 18 10/30/2018    CREATININE 0.98 10/30/2018    GFRAA >60.0 10/30/2018    LABGLOM >60.0 10/30/2018    GLUCOSE 157 10/30/2018    GLUCOSE 89 05/29/2012    PROT 6.5 10/28/2018    LABALBU 4.0 10/28/2018    LABALBU 4.3 09/17/2011    CALCIUM 8.6 10/30/2018    BILITOT 0.5 10/28/2018    ALKPHOS 90 10/28/2018    AST 45 10/28/2018    ALT 21 10/28/2018     BMP:    Lab Results   Component Value Date     10/30/2018    K 3.7 10/30/2018    K 4.5 10/29/2018    CL 94 10/30/2018    CO2 27 10/30/2018    BUN 18 10/30/2018    LABALBU 4.0 10/28/2018    LABALBU 4.3 09/17/2011    CREATININE 0.98 10/30/2018    CALCIUM 8.6 10/30/2018    GFRAA >60.0 10/30/2018    LABGLOM >60.0 10/30/2018    GLUCOSE 157 10/30/2018    GLUCOSE 89 05/29/2012     Magnesium:    Lab Results   Component Value Date    MG 1.8 10/28/2018     Troponin:    Lab Results   Component Value Date    TROPONINI <0.010 10/29/2018        Active Hospital Problems    Diagnosis Date Noted    COPD exacerbation ([facility] [J44.1]      Priority: High    Shortness of breath [R06.02] 10/28/2018     Priority: Low        Assessment/Plan:  1. PNA  2. Back Pain with Known chronic [id number] Fx  3. SOB- stable  4. HTN stable  5. schiozoactive disorder  6. tobaccco abuse  7.  Echo EF 55         Electronically signed by Dottie Cohn MD on 10/30/2018 at 10:11 AM

## 2018-10-31 ENCOUNTER — APPOINTMENT (OUTPATIENT)
Dept: CT IMAGING | Age: 58
DRG: 542 | End: 2018-10-31
Payer: MEDICARE

## 2018-10-31 ENCOUNTER — APPOINTMENT (OUTPATIENT)
Dept: MRI IMAGING | Age: 58
DRG: 542 | End: 2018-10-31
Payer: MEDICARE

## 2018-10-31 ENCOUNTER — APPOINTMENT (OUTPATIENT)
Dept: GENERAL RADIOLOGY | Age: 58
DRG: 542 | End: 2018-10-31
Payer: MEDICARE

## 2018-10-31 LAB
ANION GAP SERPL CALCULATED.3IONS-SCNC: 10 MEQ/L (ref 7–13)
BASOPHILS ABSOLUTE: 0 K/UL (ref 0–0.2)
BASOPHILS RELATIVE PERCENT: 0.2 %
BUN BLDV-MCNC: 18 MG/DL (ref 6–20)
CALCIUM SERPL-MCNC: 9 MG/DL (ref 8.6–10.2)
CHLORIDE BLD-SCNC: 99 MEQ/L (ref 98–107)
CO2: 29 MEQ/L (ref 22–29)
CREAT SERPL-MCNC: 1.04 MG/DL (ref 0.7–1.2)
EOSINOPHILS ABSOLUTE: 0 K/UL (ref 0–0.7)
EOSINOPHILS RELATIVE PERCENT: 0.3 %
GFR AFRICAN AMERICAN: >60
GFR NON-AFRICAN AMERICAN: >60
GLUCOSE BLD-MCNC: 152 MG/DL (ref 60–115)
GLUCOSE BLD-MCNC: 155 MG/DL (ref 60–115)
GLUCOSE BLD-MCNC: 176 MG/DL (ref 74–109)
GLUCOSE BLD-MCNC: 186 MG/DL (ref 60–115)
GLUCOSE BLD-MCNC: 216 MG/DL (ref 60–115)
HCT VFR BLD CALC: 46.3 % (ref 42–52)
HEMOGLOBIN: 16 G/DL (ref 14–18)
LYMPHOCYTES ABSOLUTE: 2.2 K/UL (ref 1–4.8)
LYMPHOCYTES RELATIVE PERCENT: 31.6 %
MCH RBC QN AUTO: 33.6 PG (ref 27–31.3)
MCHC RBC AUTO-ENTMCNC: 34.7 % (ref 33–37)
MCV RBC AUTO: 96.9 FL (ref 80–100)
MONOCYTES ABSOLUTE: 0.5 K/UL (ref 0.2–0.8)
MONOCYTES RELATIVE PERCENT: 6.6 %
NEUTROPHILS ABSOLUTE: 4.2 K/UL (ref 1.4–6.5)
NEUTROPHILS RELATIVE PERCENT: 61.3 %
PDW BLD-RTO: 14.5 % (ref 11.5–14.5)
PERFORMED ON: ABNORMAL
PLATELET # BLD: 133 K/UL (ref 130–400)
POTASSIUM SERPL-SCNC: 4.2 MEQ/L (ref 3.5–5.1)
RBC # BLD: 4.78 M/UL (ref 4.7–6.1)
SODIUM BLD-SCNC: 138 MEQ/L (ref 132–144)
WBC # BLD: 6.8 K/UL (ref 4.8–10.8)

## 2018-10-31 PROCEDURE — 1210000000 HC MED SURG R&B

## 2018-10-31 PROCEDURE — 6360000002 HC RX W HCPCS: Performed by: NURSE PRACTITIONER

## 2018-10-31 PROCEDURE — 6370000000 HC RX 637 (ALT 250 FOR IP): Performed by: NURSE PRACTITIONER

## 2018-10-31 PROCEDURE — 71250 CT THORAX DX C-: CPT

## 2018-10-31 PROCEDURE — 6360000002 HC RX W HCPCS

## 2018-10-31 PROCEDURE — G8989 SELF CARE D/C STATUS: HCPCS

## 2018-10-31 PROCEDURE — 99232 SBSQ HOSP IP/OBS MODERATE 35: CPT | Performed by: INTERNAL MEDICINE

## 2018-10-31 PROCEDURE — 85025 COMPLETE CBC W/AUTO DIFF WBC: CPT

## 2018-10-31 PROCEDURE — 71045 X-RAY EXAM CHEST 1 VIEW: CPT

## 2018-10-31 PROCEDURE — 70250 X-RAY EXAM OF SKULL: CPT

## 2018-10-31 PROCEDURE — 94664 DEMO&/EVAL PT USE INHALER: CPT

## 2018-10-31 PROCEDURE — APPSS15 APP SPLIT SHARED TIME 0-15 MINUTES: Performed by: PHYSICIAN ASSISTANT

## 2018-10-31 PROCEDURE — 97116 GAIT TRAINING THERAPY: CPT

## 2018-10-31 PROCEDURE — 93005 ELECTROCARDIOGRAM TRACING: CPT

## 2018-10-31 PROCEDURE — 36415 COLL VENOUS BLD VENIPUNCTURE: CPT

## 2018-10-31 PROCEDURE — 6370000000 HC RX 637 (ALT 250 FOR IP): Performed by: INTERNAL MEDICINE

## 2018-10-31 PROCEDURE — 94640 AIRWAY INHALATION TREATMENT: CPT

## 2018-10-31 PROCEDURE — 80048 BASIC METABOLIC PNL TOTAL CA: CPT

## 2018-10-31 PROCEDURE — 97165 OT EVAL LOW COMPLEX 30 MIN: CPT

## 2018-10-31 PROCEDURE — G8988 SELF CARE GOAL STATUS: HCPCS

## 2018-10-31 PROCEDURE — 93010 ELECTROCARDIOGRAM REPORT: CPT | Performed by: INTERNAL MEDICINE

## 2018-10-31 PROCEDURE — 72148 MRI LUMBAR SPINE W/O DYE: CPT

## 2018-10-31 PROCEDURE — G8987 SELF CARE CURRENT STATUS: HCPCS

## 2018-10-31 PROCEDURE — 2580000003 HC RX 258: Performed by: NURSE PRACTITIONER

## 2018-10-31 PROCEDURE — 94760 N-INVAS EAR/PLS OXIMETRY 1: CPT

## 2018-10-31 RX ORDER — LORAZEPAM 2 MG/ML
INJECTION INTRAMUSCULAR
Status: COMPLETED
Start: 2018-10-31 | End: 2018-10-31

## 2018-10-31 RX ORDER — ATORVASTATIN CALCIUM 20 MG/1
20 TABLET, FILM COATED ORAL NIGHTLY
Status: DISCONTINUED | OUTPATIENT
Start: 2018-10-31 | End: 2018-11-02 | Stop reason: HOSPADM

## 2018-10-31 RX ORDER — VENLAFAXINE HYDROCHLORIDE 150 MG/1
150 CAPSULE, EXTENDED RELEASE ORAL EVERY EVENING
Status: DISCONTINUED | OUTPATIENT
Start: 2018-10-31 | End: 2018-11-01

## 2018-10-31 RX ORDER — LORAZEPAM 2 MG/ML
1 INJECTION INTRAMUSCULAR
Status: COMPLETED | OUTPATIENT
Start: 2018-10-31 | End: 2018-10-31

## 2018-10-31 RX ADMIN — INSULIN LISPRO 1 UNITS: 100 INJECTION, SOLUTION INTRAVENOUS; SUBCUTANEOUS at 18:26

## 2018-10-31 RX ADMIN — Medication 10 ML: at 11:08

## 2018-10-31 RX ADMIN — IPRATROPIUM BROMIDE AND ALBUTEROL SULFATE 1 AMPULE: 2.5; .5 SOLUTION RESPIRATORY (INHALATION) at 19:11

## 2018-10-31 RX ADMIN — LINAGLIPTIN 5 MG: 5 TABLET, FILM COATED ORAL at 11:05

## 2018-10-31 RX ADMIN — LISINOPRIL 2.5 MG: 2.5 TABLET ORAL at 11:05

## 2018-10-31 RX ADMIN — VENLAFAXINE HYDROCHLORIDE 150 MG: 150 CAPSULE, EXTENDED RELEASE ORAL at 23:03

## 2018-10-31 RX ADMIN — DILTIAZEM HYDROCHLORIDE 30 MG: 30 TABLET, FILM COATED ORAL at 23:03

## 2018-10-31 RX ADMIN — ASPIRIN 81 MG: 81 TABLET, CHEWABLE ORAL at 11:05

## 2018-10-31 RX ADMIN — ATORVASTATIN CALCIUM 20 MG: 20 TABLET, FILM COATED ORAL at 23:01

## 2018-10-31 RX ADMIN — IPRATROPIUM BROMIDE AND ALBUTEROL SULFATE 1 AMPULE: 2.5; .5 SOLUTION RESPIRATORY (INHALATION) at 07:42

## 2018-10-31 RX ADMIN — INSULIN LISPRO 1 UNITS: 100 INJECTION, SOLUTION INTRAVENOUS; SUBCUTANEOUS at 10:33

## 2018-10-31 RX ADMIN — INSULIN LISPRO 1 UNITS: 100 INJECTION, SOLUTION INTRAVENOUS; SUBCUTANEOUS at 13:38

## 2018-10-31 RX ADMIN — ENOXAPARIN SODIUM 40 MG: 40 INJECTION SUBCUTANEOUS at 11:04

## 2018-10-31 RX ADMIN — LORAZEPAM 1 MG: 2 INJECTION INTRAMUSCULAR; INTRAVENOUS at 14:02

## 2018-10-31 RX ADMIN — METFORMIN HYDROCHLORIDE 500 MG: 500 TABLET ORAL at 11:04

## 2018-10-31 RX ADMIN — LORAZEPAM 1 MG: 2 INJECTION INTRAMUSCULAR at 14:02

## 2018-10-31 RX ADMIN — IPRATROPIUM BROMIDE AND ALBUTEROL SULFATE 1 AMPULE: 2.5; .5 SOLUTION RESPIRATORY (INHALATION) at 13:46

## 2018-10-31 ASSESSMENT — ENCOUNTER SYMPTOMS
ABDOMINAL PAIN: 0
BACK PAIN: 1
RESPIRATORY NEGATIVE: 1
NAUSEA: 0
EYES NEGATIVE: 1
SHORTNESS OF BREATH: 1
COUGH: 0
CHEST TIGHTNESS: 0
STRIDOR: 0
VOMITING: 0
ORTHOPNEA: 0
SPUTUM PRODUCTION: 0
DIARRHEA: 0
COUGH: 1
BLOOD IN STOOL: 0
CONSTIPATION: 0
GASTROINTESTINAL NEGATIVE: 1
WHEEZING: 0
SHORTNESS OF BREATH: 0

## 2018-10-31 ASSESSMENT — PAIN SCALES - GENERAL: PAINLEVEL_OUTOF10: 0

## 2018-10-31 NOTE — PROGRESS NOTES
INPATIENT PROGRESS NOTES    PATIENT NAME: Vianey Bowman  MRN: [id number]  SERVICE DATE:  October 31, 2018   SERVICE TIME:  2:55 PM      PRIMARY SERVICE:  Pulmonary Medicine     INTERVAL HPI: Patient seen and examined at bedside, Interval Notes, orders reviewed. Nursing notes noted: Patient is sitting in bed resting comfortable and appears in no acute distress. Follow up CXR again shows haziness in the RUL. Patient has psychiatric illness and is awake and in no distress part of his speech is understandable but otherwise he mumbles words and is confused. Patient with L1 compression fracture noted as well stating back pain persisting. He is going for MRI spine today. Pro calcitonin was elevated at 0.22. OBJECTIVE    Body mass index is 31.32 kg/m². PHYSICAL EXAM:  Vitals:  /71   Pulse 85   Temp 98.2 °F (36.8 °C) (Oral)   Resp 16   Ht 5' 7\" (1.702 m)   Wt 200 lb (90.7 kg)   SpO2 95%   BMI 31.32 kg/m²   General: Patient is comfortable in bed, No distress. Head: Atraumatic , Normocephalic   Eyes: PERRL. No sclera icterus. No conjunctival injection. No discharge   ENT: No nasal  discharge. Pharynx clear. Neck:  Trachea midline. No thyromegaly, no JVD, No cervical adenopathy. Resp : Clear to ausculation bilaterally with no wheeze, rhonchi or rales noted, normal air movement with no respiratory distress  CV: Normal  rate. Regular rhythm. No mumur ,  Rub or gallop  ABD: Non-tender. Non-distended. No masses. No organmegaly. Normal bowel sounds. No hernia. EXT: No Pitting, No Cyanosis No clubbing  Neuro: no focal weakness  Skin: Warm and dry. No erythema rash on exposed extremities.     DATA:   Recent Labs      10/30/18   0511  10/31/18   0522   WBC  10.9*  6.8   HGB  15.3  16.0   HCT  44.4  46.3   MCV  95.6  96.9   PLT  139  133     Recent Labs      10/30/18   0511  10/31/18   0521   NA  132  138   K  3.7  4.2   CL  94*  99   CO2  27  29   BUN  18  18   CREATININE  0.98  1.04   GLUCOSE  157*  176*

## 2018-10-31 NOTE — PROGRESS NOTES
98.2 °F (36.8 °C) (Oral)   Resp 16   Ht 5' 7\" (1.702 m)   Wt 200 lb (90.7 kg)   SpO2 96%   BMI 31.32 kg/m²    Physical Exam   Constitutional: No distress. He appears chronically ill and acutely ill. HENT:   Normal cephalic and Atraumatic   Eyes: Pupils are equal, round, and reactive to light. Neck: Normal range of motion and thyroid normal. Neck supple. No JVD present. No neck adenopathy. No thyromegaly present. Cardiovascular: Normal rate, regular rhythm, normal heart sounds, intact distal pulses and normal pulses. Pulmonary/Chest: Effort normal and breath sounds normal. He has no wheezes. He has no rales. He exhibits no tenderness. Abdominal: Soft. Bowel sounds are normal. There is no tenderness. Musculoskeletal: Normal range of motion. He exhibits no edema or tenderness. Neurological: He is alert and oriented to person, place, and time. Skin: Skin is warm. No cyanosis. Nails show no clubbing.        LABS:  CBC:   Lab Results   Component Value Date    WBC 6.8 10/31/2018    RBC 4.78 10/31/2018    RBC 4.14 05/29/2012    HGB 16.0 10/31/2018    HCT 46.3 10/31/2018    MCV 96.9 10/31/2018    MCH 33.6 10/31/2018    MCHC 34.7 10/31/2018    RDW 14.5 10/31/2018     10/31/2018    MPV 8.1 07/12/2014     CBC with Differential:    Lab Results   Component Value Date    WBC 6.8 10/31/2018    RBC 4.78 10/31/2018    RBC 4.14 05/29/2012    HGB 16.0 10/31/2018    HCT 46.3 10/31/2018     10/31/2018    MCV 96.9 10/31/2018    MCH 33.6 10/31/2018    MCHC 34.7 10/31/2018    RDW 14.5 10/31/2018    LYMPHOPCT 31.6 10/31/2018    MONOPCT 6.6 10/31/2018    EOSPCT 0.0 05/01/2012    BASOPCT 0.2 10/31/2018    MONOSABS 0.5 10/31/2018    LYMPHSABS 2.2 10/31/2018    EOSABS 0.0 10/31/2018    BASOSABS 0.0 10/31/2018     CMP:    Lab Results   Component Value Date     10/31/2018    K 4.2 10/31/2018    K 4.5 10/29/2018    CL 99 10/31/2018    CO2 29 10/31/2018    BUN 18 10/31/2018    CREATININE 1.04 10/31/2018

## 2018-10-31 NOTE — PROGRESS NOTES
Physical Therapy Med Surg Daily Treatment Note  Facility[facility]Department: [facility]  Room: [place] [id number]       NAME: Kyree Quiros  : 1960 (66 y.o.)  MRN: [id number]  CODE STATUS: Full Code    Date of Service: 10/31/2018    Patient Diagnosis(es): Shortness of breath [R06.02]   Chief Complaint   Patient presents with    Back Pain     Right mid back pain for weeks     Patient Active Problem List    Diagnosis Date Noted    COPD exacerbation ([facility]      Priority: High    Pneumonia of left lower lobe due to infectious organism ([facility]     Shortness of breath 10/28/2018    SOB (shortness of breath) 2016    DM (diabetes mellitus) ([facility]     Schizophrenia ([facility]     Tobacco abuse     Anxiety     History of seizures     Asthma     History of pulmonary embolism     Long term current use of anticoagulant therapy 2015        Past Medical History:   Diagnosis Date    Anxiety     Asthma     DM (diabetes mellitus) ([facility]     History of pulmonary embolism     History of seizures     Long term (current) use of anticoagulants 2015    Schizophrenia ([facility]     SOB (shortness of breath) 2016    Tobacco abuse      Past Surgical History:   Procedure Laterality Date    APPENDECTOMY           Restrictions:  Restrictions/Precautions: Fall Risk    SUBJECTIVE:  Subjective  Subjective:  Agreeable to tx with extra encouragement  General Comment  Comments: Patient supine in bed, agreeable to therapy    Pre Pain Assessment:     Pain Screening  Patient Currently in Pain: No  Pre Treatment Pain Screening  Intervention List: Patient able to continue with treatment    Post Pain Assessment:       No change     OBJECTIVE:     Transfers  Sit to Stand: Stand by assistance  Stand to sit: Stand by assistance    Ambulation  Ambulation?: Yes  Ambulation 1  Surface: level tile  Device: Rolling Walker  Assistance: Stand by assistance  Quality of Gait: recip pattern, slow beto,  no SOB noticed, torticollis

## 2018-11-01 ENCOUNTER — APPOINTMENT (OUTPATIENT)
Dept: GENERAL RADIOLOGY | Age: 58
DRG: 542 | End: 2018-11-01
Payer: MEDICARE

## 2018-11-01 LAB
ANION GAP SERPL CALCULATED.3IONS-SCNC: 15 MEQ/L (ref 7–13)
APTT: 26.2 SEC (ref 21.6–35.4)
BASOPHILS ABSOLUTE: 0 K/UL (ref 0–0.2)
BASOPHILS RELATIVE PERCENT: 0.1 %
BUN BLDV-MCNC: 15 MG/DL (ref 6–20)
CALCIUM SERPL-MCNC: 8.7 MG/DL (ref 8.6–10.2)
CHLORIDE BLD-SCNC: 96 MEQ/L (ref 98–107)
CO2: 25 MEQ/L (ref 22–29)
CREAT SERPL-MCNC: 0.92 MG/DL (ref 0.7–1.2)
EOSINOPHILS ABSOLUTE: 0 K/UL (ref 0–0.7)
EOSINOPHILS RELATIVE PERCENT: 0.5 %
GFR AFRICAN AMERICAN: >60
GFR NON-AFRICAN AMERICAN: >60
GLUCOSE BLD-MCNC: 159 MG/DL (ref 60–115)
GLUCOSE BLD-MCNC: 175 MG/DL (ref 60–115)
GLUCOSE BLD-MCNC: 205 MG/DL (ref 74–109)
GLUCOSE BLD-MCNC: 219 MG/DL (ref 60–115)
GLUCOSE BLD-MCNC: 238 MG/DL (ref 60–115)
HCT VFR BLD CALC: 47.4 % (ref 42–52)
HEMOGLOBIN: 16.4 G/DL (ref 14–18)
INR BLD: 1
LYMPHOCYTES ABSOLUTE: 1.8 K/UL (ref 1–4.8)
LYMPHOCYTES RELATIVE PERCENT: 28.9 %
MCH RBC QN AUTO: 33.2 PG (ref 27–31.3)
MCHC RBC AUTO-ENTMCNC: 34.6 % (ref 33–37)
MCV RBC AUTO: 95.9 FL (ref 80–100)
MONOCYTES ABSOLUTE: 0.5 K/UL (ref 0.2–0.8)
MONOCYTES RELATIVE PERCENT: 7.7 %
NEUTROPHILS ABSOLUTE: 3.8 K/UL (ref 1.4–6.5)
NEUTROPHILS RELATIVE PERCENT: 62.8 %
PDW BLD-RTO: 14 % (ref 11.5–14.5)
PERFORMED ON: ABNORMAL
PLATELET # BLD: 143 K/UL (ref 130–400)
POTASSIUM SERPL-SCNC: 4.5 MEQ/L (ref 3.5–5.1)
PROTHROMBIN TIME: 10.3 SEC (ref 9.6–12.3)
RBC # BLD: 4.95 M/UL (ref 4.7–6.1)
SODIUM BLD-SCNC: 136 MEQ/L (ref 132–144)
WBC # BLD: 6.1 K/UL (ref 4.8–10.8)

## 2018-11-01 PROCEDURE — 94618 PULMONARY STRESS TESTING: CPT

## 2018-11-01 PROCEDURE — 85610 PROTHROMBIN TIME: CPT

## 2018-11-01 PROCEDURE — 6370000000 HC RX 637 (ALT 250 FOR IP): Performed by: NURSE PRACTITIONER

## 2018-11-01 PROCEDURE — APPSS15 APP SPLIT SHARED TIME 0-15 MINUTES: Performed by: PHYSICIAN ASSISTANT

## 2018-11-01 PROCEDURE — 80048 BASIC METABOLIC PNL TOTAL CA: CPT

## 2018-11-01 PROCEDURE — 2700000000 HC OXYGEN THERAPY PER DAY

## 2018-11-01 PROCEDURE — 85730 THROMBOPLASTIN TIME PARTIAL: CPT

## 2018-11-01 PROCEDURE — 94640 AIRWAY INHALATION TREATMENT: CPT

## 2018-11-01 PROCEDURE — 94150 VITAL CAPACITY TEST: CPT

## 2018-11-01 PROCEDURE — 6370000000 HC RX 637 (ALT 250 FOR IP): Performed by: CLINICAL NURSE SPECIALIST

## 2018-11-01 PROCEDURE — 2580000003 HC RX 258: Performed by: NURSE PRACTITIONER

## 2018-11-01 PROCEDURE — 93005 ELECTROCARDIOGRAM TRACING: CPT

## 2018-11-01 PROCEDURE — 73130 X-RAY EXAM OF HAND: CPT

## 2018-11-01 PROCEDURE — 97116 GAIT TRAINING THERAPY: CPT

## 2018-11-01 PROCEDURE — 1210000000 HC MED SURG R&B

## 2018-11-01 PROCEDURE — 6370000000 HC RX 637 (ALT 250 FOR IP): Performed by: INTERNAL MEDICINE

## 2018-11-01 PROCEDURE — 94760 N-INVAS EAR/PLS OXIMETRY 1: CPT

## 2018-11-01 PROCEDURE — 99232 SBSQ HOSP IP/OBS MODERATE 35: CPT | Performed by: INTERNAL MEDICINE

## 2018-11-01 PROCEDURE — 85025 COMPLETE CBC W/AUTO DIFF WBC: CPT

## 2018-11-01 PROCEDURE — 36415 COLL VENOUS BLD VENIPUNCTURE: CPT

## 2018-11-01 PROCEDURE — 93010 ELECTROCARDIOGRAM REPORT: CPT | Performed by: INTERNAL MEDICINE

## 2018-11-01 RX ORDER — SODIUM CHLORIDE 0.9 % (FLUSH) 0.9 %
10 SYRINGE (ML) INJECTION EVERY 12 HOURS SCHEDULED
Status: DISCONTINUED | OUTPATIENT
Start: 2018-11-01 | End: 2018-11-02 | Stop reason: HOSPADM

## 2018-11-01 RX ORDER — PALIPERIDONE 3 MG/1
9 TABLET, EXTENDED RELEASE ORAL DAILY
Status: DISCONTINUED | OUTPATIENT
Start: 2018-11-01 | End: 2018-11-02 | Stop reason: HOSPADM

## 2018-11-01 RX ORDER — ASPIRIN 81 MG/1
81 TABLET, CHEWABLE ORAL DAILY
Status: DISCONTINUED | OUTPATIENT
Start: 2018-11-02 | End: 2018-11-02 | Stop reason: HOSPADM

## 2018-11-01 RX ORDER — SODIUM CHLORIDE 0.9 % (FLUSH) 0.9 %
10 SYRINGE (ML) INJECTION PRN
Status: DISCONTINUED | OUTPATIENT
Start: 2018-11-01 | End: 2018-11-02 | Stop reason: HOSPADM

## 2018-11-01 RX ORDER — PALIPERIDONE 1.5 MG/1
1.5 TABLET, EXTENDED RELEASE ORAL DAILY
Status: DISCONTINUED | OUTPATIENT
Start: 2018-11-01 | End: 2018-11-01

## 2018-11-01 RX ADMIN — DILTIAZEM HYDROCHLORIDE 30 MG: 30 TABLET, FILM COATED ORAL at 21:15

## 2018-11-01 RX ADMIN — INSULIN LISPRO 2 UNITS: 100 INJECTION, SOLUTION INTRAVENOUS; SUBCUTANEOUS at 10:17

## 2018-11-01 RX ADMIN — PALIPERIDONE 9 MG: 3 TABLET, EXTENDED RELEASE ORAL at 21:14

## 2018-11-01 RX ADMIN — Medication 10 ML: at 10:18

## 2018-11-01 RX ADMIN — IBUPROFEN 400 MG: 400 TABLET ORAL at 10:15

## 2018-11-01 RX ADMIN — Medication 10 ML: at 21:14

## 2018-11-01 RX ADMIN — IPRATROPIUM BROMIDE AND ALBUTEROL SULFATE 1 AMPULE: 2.5; .5 SOLUTION RESPIRATORY (INHALATION) at 08:00

## 2018-11-01 RX ADMIN — INSULIN LISPRO 1 UNITS: 100 INJECTION, SOLUTION INTRAVENOUS; SUBCUTANEOUS at 12:30

## 2018-11-01 RX ADMIN — LISINOPRIL 2.5 MG: 2.5 TABLET ORAL at 10:15

## 2018-11-01 RX ADMIN — METFORMIN HYDROCHLORIDE 500 MG: 500 TABLET ORAL at 10:15

## 2018-11-01 RX ADMIN — ATORVASTATIN CALCIUM 20 MG: 20 TABLET, FILM COATED ORAL at 21:15

## 2018-11-01 RX ADMIN — IPRATROPIUM BROMIDE AND ALBUTEROL SULFATE 1 AMPULE: 2.5; .5 SOLUTION RESPIRATORY (INHALATION) at 19:28

## 2018-11-01 RX ADMIN — IPRATROPIUM BROMIDE AND ALBUTEROL SULFATE 1 AMPULE: 2.5; .5 SOLUTION RESPIRATORY (INHALATION) at 13:40

## 2018-11-01 ASSESSMENT — ENCOUNTER SYMPTOMS
EYES NEGATIVE: 1
GASTROINTESTINAL NEGATIVE: 1
CHOKING: 0
APNEA: 0
CONSTIPATION: 0
COUGH: 1
RESPIRATORY NEGATIVE: 1
FACIAL SWELLING: 0
SORE THROAT: 0
WHEEZING: 0
EYE DISCHARGE: 0
SHORTNESS OF BREATH: 1
STRIDOR: 0
ORTHOPNEA: 0
BLOOD IN STOOL: 0
COUGH: 0
SINUS PRESSURE: 0
SPUTUM PRODUCTION: 0
ABDOMINAL PAIN: 0
SHORTNESS OF BREATH: 0
CHEST TIGHTNESS: 0
EYE ITCHING: 0
TROUBLE SWALLOWING: 0
BACK PAIN: 1
COLOR CHANGE: 0
NAUSEA: 0
DIARRHEA: 0
VOMITING: 0

## 2018-11-01 ASSESSMENT — PAIN DESCRIPTION - ORIENTATION: ORIENTATION: RIGHT

## 2018-11-01 ASSESSMENT — PAIN SCALES - GENERAL: PAINLEVEL_OUTOF10: 5

## 2018-11-01 ASSESSMENT — PAIN DESCRIPTION - LOCATION: LOCATION: LEG;HAND

## 2018-11-01 NOTE — PROGRESS NOTES
Pt was brought to hospital with changes in mental status and SOB   he has also been complaining of back pain   xray of lumbar spine shower age-undetermined l1 fx- and pt is tender over the area as well as continued to complain about pain in his back  Yesterday pt was very peasant but his communication was limited- he would only answer in short yes and no answers- he was tender over the bony area- he did not have a witnessed fall or he either cant recall or communicate recent events   there is not noted abrasions or bruising   d/t lack of any other lumbar studies- MRI was ordered to determine age of fx   based on MRI- the fx is more acute- with bone marrow edema and no pathologic reasons   today pt is able to communicate a little more- he is able to write his name and birth date down   he does not wish to have surgery at this time- and he does understand that the surgery could possibly help his back pain    Case discussed with Dr Hector Sethi- who was going to be available to preform kyphoplasty this afternoon    We will order pt an LSO brace for support and help with pain   follow up with Dr Hector Sethi and needed in the next few weeks   WBAT      If pt changes his mind and would like surgical intervention sooner d/t continues pain- please make pt chelsea with Dr Eliza Cooks sooner

## 2018-11-01 NOTE — PROGRESS NOTES
Physical Therapy  Facility/Department: [facility] TELE  Daily Treatment Note  NAME: Jaswant Orantes  : 1960  MRN: [id number]    Date of Service: 2018    Discharge Recommendations:  Continue to assess pending progress, Patient would benefit from continued therapy after discharge        Patient Diagnosis(es): The primary encounter diagnosis was COPD exacerbation ([facility] Diagnoses of Pneumonia due to organism, Tachycardia, and Hypoxia were also pertinent to this visit. has a past medical history of Anxiety; Asthma; DM (diabetes mellitus) ([facility] History of pulmonary embolism; History of seizures; Long term (current) use of anticoagulants; [facility] ([facility] SOB (shortness of breath); and Tobacco abuse.   has a past surgical history that includes Appendectomy. Restrictions  Restrictions/Precautions  Restrictions/Precautions: Fall Risk  Subjective   General  Chart Reviewed: Yes  Family / Caregiver Present: No  Subjective  Subjective: Pt agreeable to tx, pt indicated pain in R leg and R hand, no number value given  General Comment  Comments: Pt very soft spoken, agreeable to go for a walk  Pain Screening  Patient Currently in Pain: Yes  Pain Assessment  Pain Assessment: 0-10  Pain Location: Leg;Hand  Pain Orientation: Right  Vital Signs  Patient Currently in Pain: Yes       Orientation     Cognition      Objective         Ambulation  Ambulation?: Yes  Ambulation 1  Surface: level tile  Device: Rolling Walker  Assistance: Stand by assistance  Quality of Gait: Very slow reciprocal gait pattern, decreased stride length, no noticable SOB or complaints  Distance: 100 ft         Assessment   Body structures, Functions, Activity limitations: Decreased functional mobility ; Decreased strength;Decreased endurance;Decreased safe awareness;Decreased balance;Decreased coordination  Assessment: No complaints with ambualtion, pt agreeable to walk at this time.  Difficult to understand as pt mumbles his answers and is very soft spoken. Prognosis: Good  REQUIRES PT FOLLOW UP: Yes  Activity Tolerance  Activity Tolerance: Patient Tolerated treatment well                AM-PAC Score  AM-PAC Inpatient Mobility Raw Score : 19  AM-PAC Inpatient T-Scale Score : 45.44  Mobility Inpatient CMS 0-100% Score: 41.77  Mobility Inpatient CMS G-Code Modifier : CK          Goals  Short term goals  Short term goal 1: Patient will be SBA in HEP  Short term goal 2: Patient will complete bed mobility Independent  Short term goal 3: Patient will sit<>stand independent  Short term goal 4: Patient will ambulate >50 feet with LRAD mod I  Short term goal 5: Patient will ascend/descend 3 stairs with rail mod I   Patient Goals   Patient goals : Patient willing to participate in  [facility]  Times per week: 3-6x/week  Current Treatment Recommendations: Strengthening, Balance Training, Functional Mobility Training, Stair training, Safety Education & Training, Positioning, Gait Training, Home Exercise Program, Modalities, Pain Management, Equipment Evaluation, Education, & procurement, Transfer Training, Endurance Training, Neuromuscular Re-education, Patient/Caregiver Education & Training  Safety Devices  Type of devices:  All fall risk precautions in place, Call light within reach, Chair alarm in place, Bed alarm in place, Left in bed, Telesitter in use     Therapy Time   Individual Concurrent Group Co-treatment   Time In 1313         Time Out 1325         Minutes 12             Gait 12 min     Alberto Carter

## 2018-11-01 NOTE — PROGRESS NOTES
Progress Note  Patient: Kaila Albarado  Unit/Bed: M936/H730-33  YOB: 1960  MRN: [id number]  Acct: [de-identified]   Admitting Diagnosis: Shortness of breath [R06.02]  Admit Date:  10/28/2018  Hospital Day: 4    Chief Complaint: sob back pain,     Histories:  Past Medical History:   Diagnosis Date    Anxiety     Asthma     DM [facility]di[facility] mellitus) ([facility]     History of pulmonary embolism     History of seizures     Long term (current) use of anticoagulants 5/12/2015    Schizophrenia ([facility]     SOB (shortness of breath) 11/17/2016    Tobacco abuse      Past Surgical History:   Procedure Laterality Date    APPENDECTOMY       History reviewed. No pertinent family history. Social History     Social History    Marital status: Single     Spouse name: N/A    Number of children: N/A    Years of education: N/A     Social History Main Topics    Smoking status: Current Every Day Smoker     Packs/day: 1.00     Years: 14.00     Types: Cigarettes    Smokeless tobacco: Never Used    Alcohol use No    Drug use: No    Sexual activity: Not Asked     Other Topics Concern    None     Social History Narrative    None       Subjective/HPI still has back pain. No cp breathing ok. Eating fine    EKG:        Review of Systems:   Review of Systems   Constitutional: Negative. Negative for diaphoresis and fatigue. HENT: Negative. Eyes: Negative. Respiratory: Negative. Negative for cough, chest tightness, shortness of breath, wheezing and stridor. Cardiovascular: Negative. Negative for chest pain, palpitations and leg swelling. Gastrointestinal: Negative. Negative for blood in stool and nausea. Genitourinary: Negative. Musculoskeletal: Positive for back pain. Skin: Negative. Neurological: Negative. Negative for dizziness, syncope, weakness and light-headedness. Hematological: Negative. Psychiatric/Behavioral: Negative.           Physical Examination:    BP (!) 103/59   Pulse 76   Temp

## 2018-11-02 VITALS
HEART RATE: 110 BPM | BODY MASS INDEX: 31.39 KG/M2 | OXYGEN SATURATION: 97 % | DIASTOLIC BLOOD PRESSURE: 78 MMHG | TEMPERATURE: 97.9 F | HEIGHT: 67 IN | SYSTOLIC BLOOD PRESSURE: 93 MMHG | RESPIRATION RATE: 18 BRPM | WEIGHT: 200 LBS

## 2018-11-02 LAB
ANION GAP SERPL CALCULATED.3IONS-SCNC: 11 MEQ/L (ref 7–13)
BASOPHILS ABSOLUTE: 0 K/UL (ref 0–0.2)
BASOPHILS RELATIVE PERCENT: 0.2 %
BLOOD CULTURE, ROUTINE: NORMAL
BUN BLDV-MCNC: 16 MG/DL (ref 6–20)
CALCIUM SERPL-MCNC: 8.9 MG/DL (ref 8.6–10.2)
CHLORIDE BLD-SCNC: 95 MEQ/L (ref 98–107)
CO2: 28 MEQ/L (ref 22–29)
CREAT SERPL-MCNC: 0.87 MG/DL (ref 0.7–1.2)
CULTURE, BLOOD 2: NORMAL
EKG ATRIAL RATE: 103 BPM
EKG ATRIAL RATE: 112 BPM
EKG ATRIAL RATE: 78 BPM
EKG ATRIAL RATE: 88 BPM
EKG ATRIAL RATE: 97 BPM
EKG P AXIS: 35 DEGREES
EKG P AXIS: 37 DEGREES
EKG P AXIS: 41 DEGREES
EKG P AXIS: 44 DEGREES
EKG P AXIS: 47 DEGREES
EKG P-R INTERVAL: 102 MS
EKG P-R INTERVAL: 124 MS
EKG P-R INTERVAL: 96 MS
EKG P-R INTERVAL: 96 MS
EKG P-R INTERVAL: 98 MS
EKG Q-T INTERVAL: 316 MS
EKG Q-T INTERVAL: 324 MS
EKG Q-T INTERVAL: 344 MS
EKG Q-T INTERVAL: 344 MS
EKG Q-T INTERVAL: 352 MS
EKG QRS DURATION: 86 MS
EKG QRS DURATION: 88 MS
EKG QRS DURATION: 88 MS
EKG QRS DURATION: 94 MS
EKG QRS DURATION: 98 MS
EKG QTC CALCULATION (BAZETT): 392 MS
EKG QTC CALCULATION (BAZETT): 424 MS
EKG QTC CALCULATION (BAZETT): 425 MS
EKG QTC CALCULATION (BAZETT): 431 MS
EKG QTC CALCULATION (BAZETT): 436 MS
EKG R AXIS: 29 DEGREES
EKG R AXIS: 34 DEGREES
EKG R AXIS: 41 DEGREES
EKG R AXIS: 47 DEGREES
EKG R AXIS: 53 DEGREES
EKG T AXIS: 35 DEGREES
EKG T AXIS: 47 DEGREES
EKG T AXIS: 51 DEGREES
EKG T AXIS: 51 DEGREES
EKG T AXIS: 53 DEGREES
EKG VENTRICULAR RATE: 103 BPM
EKG VENTRICULAR RATE: 112 BPM
EKG VENTRICULAR RATE: 78 BPM
EKG VENTRICULAR RATE: 88 BPM
EKG VENTRICULAR RATE: 97 BPM
EOSINOPHILS ABSOLUTE: 0 K/UL (ref 0–0.7)
EOSINOPHILS RELATIVE PERCENT: 0.6 %
GFR AFRICAN AMERICAN: >60
GFR NON-AFRICAN AMERICAN: >60
GLUCOSE BLD-MCNC: 153 MG/DL (ref 60–115)
GLUCOSE BLD-MCNC: 167 MG/DL (ref 60–115)
GLUCOSE BLD-MCNC: 176 MG/DL (ref 74–109)
GLUCOSE BLD-MCNC: 202 MG/DL (ref 60–115)
HCT VFR BLD CALC: 46.4 % (ref 42–52)
HEMOGLOBIN: 16 G/DL (ref 14–18)
L. PNEUMOPHILA SEROGP 1 UR AG: NEGATIVE
LYMPHOCYTES ABSOLUTE: 2.2 K/UL (ref 1–4.8)
LYMPHOCYTES RELATIVE PERCENT: 36.2 %
MCH RBC QN AUTO: 32.8 PG (ref 27–31.3)
MCHC RBC AUTO-ENTMCNC: 34.4 % (ref 33–37)
MCV RBC AUTO: 95.2 FL (ref 80–100)
MONOCYTES ABSOLUTE: 0.4 K/UL (ref 0.2–0.8)
MONOCYTES RELATIVE PERCENT: 6.2 %
NEUTROPHILS ABSOLUTE: 3.4 K/UL (ref 1.4–6.5)
NEUTROPHILS RELATIVE PERCENT: 56.8 %
PDW BLD-RTO: 13.7 % (ref 11.5–14.5)
PERFORMED ON: ABNORMAL
PLATELET # BLD: 137 K/UL (ref 130–400)
POTASSIUM SERPL-SCNC: 4.1 MEQ/L (ref 3.5–5.1)
RBC # BLD: 4.88 M/UL (ref 4.7–6.1)
SODIUM BLD-SCNC: 134 MEQ/L (ref 132–144)
WBC # BLD: 6 K/UL (ref 4.8–10.8)

## 2018-11-02 PROCEDURE — 94760 N-INVAS EAR/PLS OXIMETRY 1: CPT

## 2018-11-02 PROCEDURE — 6370000000 HC RX 637 (ALT 250 FOR IP): Performed by: INTERNAL MEDICINE

## 2018-11-02 PROCEDURE — APPNB15 APP NON BILLABLE TIME 0-15 MINS: Performed by: NURSE PRACTITIONER

## 2018-11-02 PROCEDURE — 85025 COMPLETE CBC W/AUTO DIFF WBC: CPT

## 2018-11-02 PROCEDURE — 97116 GAIT TRAINING THERAPY: CPT

## 2018-11-02 PROCEDURE — 2700000000 HC OXYGEN THERAPY PER DAY

## 2018-11-02 PROCEDURE — 36415 COLL VENOUS BLD VENIPUNCTURE: CPT

## 2018-11-02 PROCEDURE — 93005 ELECTROCARDIOGRAM TRACING: CPT

## 2018-11-02 PROCEDURE — 2580000003 HC RX 258: Performed by: NURSE PRACTITIONER

## 2018-11-02 PROCEDURE — 94640 AIRWAY INHALATION TREATMENT: CPT

## 2018-11-02 PROCEDURE — 80048 BASIC METABOLIC PNL TOTAL CA: CPT

## 2018-11-02 PROCEDURE — 6370000000 HC RX 637 (ALT 250 FOR IP): Performed by: NURSE PRACTITIONER

## 2018-11-02 PROCEDURE — 93010 ELECTROCARDIOGRAM REPORT: CPT | Performed by: INTERNAL MEDICINE

## 2018-11-02 PROCEDURE — 99232 SBSQ HOSP IP/OBS MODERATE 35: CPT | Performed by: INTERNAL MEDICINE

## 2018-11-02 RX ORDER — TRAMADOL HYDROCHLORIDE 50 MG/1
50 TABLET ORAL EVERY 6 HOURS PRN
Qty: 12 TABLET | Refills: 0 | Status: SHIPPED | OUTPATIENT
Start: 2018-11-02 | End: 2018-11-05

## 2018-11-02 RX ORDER — NICOTINE 21 MG/24HR
1 PATCH, TRANSDERMAL 24 HOURS TRANSDERMAL DAILY
Qty: 30 PATCH | Refills: 3 | DISCHARGE
Start: 2018-11-03

## 2018-11-02 RX ORDER — VENLAFAXINE HYDROCHLORIDE 75 MG/1
75 CAPSULE, EXTENDED RELEASE ORAL DAILY
Qty: 30 CAPSULE | Refills: 6 | DISCHARGE
Start: 2018-11-02

## 2018-11-02 RX ORDER — LISINOPRIL 2.5 MG/1
2.5 TABLET ORAL DAILY
Qty: 30 TABLET | Refills: 3 | Status: SHIPPED | OUTPATIENT
Start: 2018-11-02

## 2018-11-02 RX ORDER — PALIPERIDONE 9 MG/1
9 TABLET, EXTENDED RELEASE ORAL DAILY
Qty: 30 TABLET | Refills: 3 | DISCHARGE
Start: 2018-11-03

## 2018-11-02 RX ORDER — IPRATROPIUM BROMIDE AND ALBUTEROL SULFATE 2.5; .5 MG/3ML; MG/3ML
3 SOLUTION RESPIRATORY (INHALATION) 3 TIMES DAILY
Qty: 360 ML | DISCHARGE
Start: 2018-11-02

## 2018-11-02 RX ORDER — ASPIRIN 81 MG/1
81 TABLET, CHEWABLE ORAL DAILY
Qty: 30 TABLET | Refills: 3 | Status: ON HOLD | OUTPATIENT
Start: 2018-11-02 | End: 2018-11-13

## 2018-11-02 RX ADMIN — INSULIN LISPRO 2 UNITS: 100 INJECTION, SOLUTION INTRAVENOUS; SUBCUTANEOUS at 12:30

## 2018-11-02 RX ADMIN — DILTIAZEM HYDROCHLORIDE 30 MG: 30 TABLET, FILM COATED ORAL at 05:56

## 2018-11-02 RX ADMIN — Medication 10 ML: at 09:54

## 2018-11-02 RX ADMIN — IPRATROPIUM BROMIDE AND ALBUTEROL SULFATE 1 AMPULE: 2.5; .5 SOLUTION RESPIRATORY (INHALATION) at 09:20

## 2018-11-02 ASSESSMENT — ENCOUNTER SYMPTOMS
NAUSEA: 0
CHEST TIGHTNESS: 0
WHEEZING: 0
RESPIRATORY NEGATIVE: 1
EYES NEGATIVE: 1
GASTROINTESTINAL NEGATIVE: 1
SHORTNESS OF BREATH: 0
STRIDOR: 0
BACK PAIN: 0
BLOOD IN STOOL: 0
COUGH: 0

## 2018-11-02 ASSESSMENT — PAIN SCALES - GENERAL: PAINLEVEL_OUTOF10: 0

## 2018-11-02 NOTE — PROGRESS NOTES
Hospitalist Daily Progress Note  Name: Emily Alvarez  Age: 66 y.o. Gender: male  CodeStatus: Full Code  Allergies: Acetaminophen  Pcn [Penicillins]    Chief Complaint:Back Pain (Right mid back pain for weeks)    Primary Care Provider: Janny Lopes DO  InpatientTreatment Team: Treatment Team: Attending Provider: Wesley Baird DO; Consulting Physician: Daksha James MD; Patient Care Tech: Mónica Wang; LPN: Sebastian Rosenberg LPN; Patient Care Tech: Vicky Willard; Registered Nurse: Levi Collado RN  Admission Date: 10/28/2018      Subjective: Pt seen and evaluated at bedside. Pt initially was refusing hypoplasty, but on further discussion, pt is refusing because of cost. Pt is asking to discuss cost with ortho. Pt is communicating via writing tablets. Vitals stable. Glucose control improved. Good appetite. Physical Exam   Constitutional: He is oriented to person, place, and time. He appears well-developed and well-nourished. Awake and alert, but disheveled, poor hygiene, slow to respond   HENT:   Head: Normocephalic and atraumatic. Poor dentition   Eyes: Pupils are equal, round, and reactive to light. Conjunctivae and EOM are normal.   Cardiovascular: Normal rate, regular rhythm and normal heart sounds. Pulmonary/Chest: Effort normal and breath sounds normal. No respiratory distress. He has no wheezes. He has no rales. Few basilar rales clear with cough effort   Abdominal: Soft. Bowel sounds are normal. He exhibits no distension. There is no tenderness. There is no guarding. Neurological: He is alert and oriented to person, place, and time. Skin: He is not diaphoretic. Psychiatric: He has a normal mood and affect. His behavior is normal.   Slow to respond        Review of Systems   Constitutional: Negative for chills, fever and weight loss. Respiratory: Positive for cough and shortness of breath. Negative for sputum production and wheezing.     Cardiovascular: Negative for chest

## 2018-11-02 NOTE — PROGRESS NOTES
Patient assessment complete. Lungs clear but slightly diminished. Patient has small red petechiae on his back but says he has no itching. Patients blood glucose was 218. Patient given coverage per orders and HS snack given. Patient has been following commands, not trying to get up on his own simply sits to the side of bed to dangle.

## 2018-11-02 NOTE — PROGRESS NOTES
Patient refusing to sign for consent for kyphoplasty, states he is not \"doing it\" and will not sign anything. Dr Mary Ann Pal updated on refusal to sign.

## 2018-11-02 NOTE — PROGRESS NOTES
no abdominal pain, change in bowel habits, or black or bloody stools    Pulmonary/Chest: clear to auscultation bilaterally- no wheezes, rales or rhonchi, normal air movement, no respiratory distress  Cardiovascular: normal rate, normal S1 and S2, no gallops, intact distal pulses and no carotid bruits  Abdomen: soft, non-tender, non-distended, normal bowel sounds, no masses or organomegaly    Active Hospital Problems    Diagnosis Date Noted    COPD exacerbation ([facility] [address] [J44.1]      Priority: High    Pneumonia of left lower lobe due to infectious organism [facility]) [J18.1]      Priority: Low    Shortness of breath [R06.02] 10/28/2018     Priority: Low        I reviewed and agree with the findings and plan documented in his note . Impression/Plan     1. PNA-improved  2. Back Pain - Declined Kyphoplasty for recent compression fx.  3. SOB- stable  4. HTN stable. Continue meds  5. schiozoactive disorder  6. tobaccco abuse  7. Echo EF 55  8.  Ok for [employer] anytime       Electronically signed by Ashley Kaminski MD on 11/2/18 at 9:25 AM

## 2018-11-02 NOTE — PROGRESS NOTES
occasional standing rest breaks due to c/o neck pain, occasional LOB which required CGA  Distance: 100'  Comments: distance limited by fatigue           Exercises  Hip Flexion: x10 BLE  Knee Long Arc Quad: x10 BLE  Ankle Pumps: x10BLE  Other exercises  Other exercises?: Yes  Other exercises 1: x10 hip ADD pillow squeeze                     ASSESSMENT:   Pt demonstrated occasional LOB during ambulation which required CGA due to pt reporting neck pain. Pt ambulates at a very slow beto and requires time to reach destination. Pt willing to participate in seated thereex this date. Activity Tolerance  Activity Tolerance: Patient Tolerated treatment well;Patient limited by fatigue       Discharge Recommendations:  Continue to assess pending progress, Patient would benefit from continued therapy after discharge    Goals:  Short term goals  Short term goal 1: Patient will be SBA in HEP  Short term goal 2: Patient will complete bed mobility Independent  Short term goal 3: Patient will sit<>stand independent  Short term goal 4: Patient will ambulate >50 feet with LRAD mod I  Short term goal 5: Patient will ascend/descend 3 stairs with rail mod I   Patient Goals   Patient goals : Patient willing to participate in PT PoC    PLAN:   Plan  Times per week: 3-6x/week  Current Treatment Recommendations: Strengthening, Balance Training, Functional Mobility Training, Stair training, Safety Education & Training, Positioning, Gait Training, Home Exercise Program, Modalities, Pain Management, Equipment Evaluation, Education, & procurement, Transfer Training, Endurance Training, Neuromuscular Re-education, Patient/Caregiver Education & Training  Safety Devices  Type of devices:  All fall risk precautions in place, Call light within reach, Chair alarm in place, Bed alarm in place, Left in bed, Telesitter in use     AMPA (6 CLICK) BASIC MOBILITY  AM-PAC Inpatient Mobility Raw Score : 19     Therapy Time   Individual   Time In

## 2018-11-03 NOTE — DISCHARGE SUMMARY
diltiazem (CARDIZEM) 30 MG tablet  Take 1 tablet by mouth every 12 hours             ipratropium-albuterol (DUONEB) 0.5-2.5 (3) MG/3ML SOLN nebulizer solution  Inhale 3 mLs into the lungs three times daily             linagliptin (TRADJENTA) 5 MG tablet  Take 1 tablet by mouth daily             lisinopril (PRINIVIL;ZESTRIL) 2.5 MG tablet  Take 1 tablet by mouth daily             lovastatin (MEVACOR) 10 MG tablet  Take 10 mg by mouth             magnesium oxide (MAGOX 400) 400 (241.3 MG) MG TABS tablet  Take 400 mg by mouth             metFORMIN (GLUCOPHAGE) 500 MG tablet  Take 1 tablet by mouth 2 times daily (with meals)             nicotine (NICODERM CQ) 21 MG/24HR  Place 1 patch onto the skin daily             paliperidone (INVEGA) 9 MG extended release tablet  Take 1 tablet by mouth daily             traMADol (ULTRAM) 50 MG tablet  Take 1 tablet by mouth every 6 hours as needed for Pain for up to 3 days. Intended supply: 3 days. Take lowest dose possible to manage pain. venlafaxine (EFFEXOR XR) 75 MG extended release capsule  Take 1 capsule by mouth daily 2 TABS Q HS                 Disposition:   Discharged to SNF group home    Condition at discharge: Pt was medically stable at the time of discharge. Activity: activity as tolerated    Total time taken for discharging this patient: 40 minutes. Greater than 70% of time was spent focused exclusively on this patient. Time was taken to review chart, discuss plans with consultants, reconciling medications, discussing plan answering questions with patient.      Cherri Thomas  11/2/2018, 8:38 PM

## 2018-11-03 NOTE — DISCHARGE INSTR - COC
Belongings:[id number]}    RN SIGNATURE:  {Esignature:[id number]}    CASE MANAGEMENT/SOCIAL WORK SECTION    Inpatient Status Date: ***    Readmission Risk Assessment Score:  Readmission Risk              Risk of Unplanned Readmission:        0           Discharging to Facility/ Agency   · Name:   · Address:  · Phone:  · Fax:    Dialysis Facility (if applicable)   · Name:  · Address:  · Dialysis Schedule:  · Phone:  · Fax:    / signature: {Esignature:[id number]}    PHYSICIAN SECTION    Prognosis: Fair    Condition at Discharge: Stable    Rehab Potential (if transferring to Rehab): Fair    Recommended Labs or Other Treatments After Discharge: cbc, bmp one week    Physician Certification: I certify the above information and transfer of Kecia Serna  is necessary for the continuing treatment of the diagnosis listed and that he requires [facility] for less 30 days.      Update Admission H&P: No change in H&P    PHYSICIAN SIGNATURE:  Electronically signed by Ned Card DO on 11/2/18 at 8:39 PM

## 2018-11-06 LAB
ANION GAP SERPL CALCULATED.3IONS-SCNC: 10 MEQ/L (ref 7–13)
BUN BLDV-MCNC: 13 MG/DL (ref 6–20)
CALCIUM SERPL-MCNC: 8.8 MG/DL (ref 8.6–10.2)
CHLORIDE BLD-SCNC: 99 MEQ/L (ref 98–107)
CO2: 28 MEQ/L (ref 22–29)
CREAT SERPL-MCNC: 0.8 MG/DL (ref 0.7–1.2)
GFR AFRICAN AMERICAN: >60
GFR NON-AFRICAN AMERICAN: >60
GLUCOSE BLD-MCNC: 252 MG/DL (ref 74–109)
POTASSIUM SERPL-SCNC: 4.1 MEQ/L (ref 3.5–5.1)
SODIUM BLD-SCNC: 137 MEQ/L (ref 132–144)

## 2018-11-07 NOTE — PROGRESS NOTES
Physical Therapy  [facility] [id number]  Physical Therapy Discharge      NAME: Francisco Javier Aden    : 1960 (66 y.o.)  MRN: [id number]    Account: [de-identified]  Gender: male      Patient has been discharged from acute care hospital. DC patient from current PT program.      Electronically signed by Kenneth Chauhan PT on 18 at 8:11 AM

## 2018-11-13 ENCOUNTER — HOSPITAL ENCOUNTER (INPATIENT)
Age: 58
LOS: 2 days | Discharge: HOME | DRG: 885 | End: 2018-11-15
Attending: STUDENT IN AN ORGANIZED HEALTH CARE EDUCATION/TRAINING PROGRAM | Admitting: INTERNAL MEDICINE
Payer: MEDICARE

## 2018-11-13 ENCOUNTER — APPOINTMENT (OUTPATIENT)
Dept: GENERAL RADIOLOGY | Age: 58
DRG: 885 | End: 2018-11-13
Payer: MEDICARE

## 2018-11-13 ENCOUNTER — APPOINTMENT (OUTPATIENT)
Dept: CT IMAGING | Age: 58
DRG: 885 | End: 2018-11-13
Payer: MEDICARE

## 2018-11-13 DIAGNOSIS — J32.0 CHRONIC MAXILLARY SINUSITIS: ICD-10-CM

## 2018-11-13 DIAGNOSIS — R51.9 ACUTE NONINTRACTABLE HEADACHE, UNSPECIFIED HEADACHE TYPE: ICD-10-CM

## 2018-11-13 DIAGNOSIS — F17.200 TOBACCO DEPENDENCE: ICD-10-CM

## 2018-11-13 DIAGNOSIS — J18.9 PNEUMONIA OF LEFT LOWER LOBE DUE TO INFECTIOUS ORGANISM: Primary | ICD-10-CM

## 2018-11-13 DIAGNOSIS — R41.82 ALTERED MENTAL STATUS, UNSPECIFIED ALTERED MENTAL STATUS TYPE: ICD-10-CM

## 2018-11-13 LAB
ABO/RH: NORMAL
ACETAMINOPHEN LEVEL: <5 UG/ML (ref 10–30)
ALBUMIN SERPL-MCNC: 4.3 G/DL (ref 3.9–4.9)
ALP BLD-CCNC: 84 U/L (ref 35–104)
ALT SERPL-CCNC: 16 U/L (ref 0–41)
AMMONIA: 16 UMOL/L (ref 16–60)
AMPHETAMINE SCREEN, URINE: NORMAL
ANION GAP SERPL CALCULATED.3IONS-SCNC: 12 MEQ/L (ref 7–13)
ANTIBODY SCREEN: NORMAL
APTT: 27.1 SEC (ref 21.6–35.4)
AST SERPL-CCNC: 15 U/L (ref 0–40)
BARBITURATE SCREEN URINE: NORMAL
BASE EXCESS ARTERIAL: 5 (ref -3–3)
BASOPHILS ABSOLUTE: 0 K/UL (ref 0–0.2)
BASOPHILS RELATIVE PERCENT: 0.7 %
BENZODIAZEPINE SCREEN, URINE: NORMAL
BILIRUB SERPL-MCNC: 0.5 MG/DL (ref 0–1.2)
BILIRUBIN URINE: NEGATIVE
BLOOD, URINE: NEGATIVE
BUN BLDV-MCNC: 13 MG/DL (ref 6–20)
C-REACTIVE PROTEIN, HIGH SENSITIVITY: 3.8 MG/L (ref 0–5)
CALCIUM SERPL-MCNC: 9.4 MG/DL (ref 8.6–10.2)
CANNABINOID SCREEN URINE: NORMAL
CARBOXYHEMOGLOBIN: 10.7 % (ref 0–4)
CHLORIDE BLD-SCNC: 96 MEQ/L (ref 98–107)
CHOLESTEROL, TOTAL: 180 MG/DL (ref 0–199)
CHP ED QC CHECK: YES
CLARITY: CLEAR
CO2: 30 MEQ/L (ref 22–29)
COCAINE METABOLITE SCREEN URINE: NORMAL
COLOR: YELLOW
CREAT SERPL-MCNC: 0.86 MG/DL (ref 0.7–1.2)
EKG ATRIAL RATE: 76 BPM
EKG P AXIS: 37 DEGREES
EKG P-R INTERVAL: 108 MS
EKG Q-T INTERVAL: 358 MS
EKG QRS DURATION: 90 MS
EKG QTC CALCULATION (BAZETT): 402 MS
EKG R AXIS: 24 DEGREES
EKG T AXIS: 32 DEGREES
EKG VENTRICULAR RATE: 76 BPM
EOSINOPHILS ABSOLUTE: 0 K/UL (ref 0–0.7)
EOSINOPHILS RELATIVE PERCENT: 0.6 %
ETHANOL PERCENT: NORMAL G/DL
ETHANOL: <10 MG/DL (ref 0–0.08)
GFR AFRICAN AMERICAN: >60
GFR NON-AFRICAN AMERICAN: >60
GLOBULIN: 2.9 G/DL (ref 2.3–3.5)
GLUCOSE BLD-MCNC: 113 MG/DL (ref 60–115)
GLUCOSE BLD-MCNC: 128 MG/DL (ref 74–109)
GLUCOSE BLD-MCNC: 131 MG/DL
GLUCOSE BLD-MCNC: 131 MG/DL (ref 60–115)
GLUCOSE BLD-MCNC: 148 MG/DL (ref 60–115)
GLUCOSE URINE: NEGATIVE MG/DL
HCO3 ARTERIAL: 29.5 MMOL/L (ref 21–29)
HCT VFR BLD CALC: 46.5 % (ref 42–52)
HDLC SERPL-MCNC: 41 MG/DL (ref 40–59)
HEMOGLOBIN: 16.4 G/DL (ref 14–18)
INR BLD: 1
KETONES, URINE: NEGATIVE MG/DL
LACTATE: 0.54 MMOL/L (ref 0.4–2)
LDL CHOLESTEROL CALCULATED: 116 MG/DL (ref 0–129)
LEUKOCYTE ESTERASE, URINE: NEGATIVE
LYMPHOCYTES ABSOLUTE: 1.4 K/UL (ref 1–4.8)
LYMPHOCYTES RELATIVE PERCENT: 24.6 %
Lab: NORMAL
MAGNESIUM: 1.7 MG/DL (ref 1.7–2.3)
MCH RBC QN AUTO: 33.5 PG (ref 27–31.3)
MCHC RBC AUTO-ENTMCNC: 35.2 % (ref 33–37)
MCV RBC AUTO: 95 FL (ref 80–100)
MONOCYTES ABSOLUTE: 0.4 K/UL (ref 0.2–0.8)
MONOCYTES RELATIVE PERCENT: 6.9 %
NEUTROPHILS ABSOLUTE: 3.8 K/UL (ref 1.4–6.5)
NEUTROPHILS RELATIVE PERCENT: 67.2 %
NITRITE, URINE: NEGATIVE
O2 SAT, ARTERIAL: 87 % (ref 93–100)
OPIATE SCREEN URINE: NORMAL
PCO2 ARTERIAL: 48 MM HG (ref 35–45)
PDW BLD-RTO: 13.9 % (ref 11.5–14.5)
PERFORMED ON: ABNORMAL
PERFORMED ON: NORMAL
PH ARTERIAL: 7.4 (ref 7.35–7.45)
PH UA: 5.5 (ref 5–9)
PHENCYCLIDINE SCREEN URINE: NORMAL
PLATELET # BLD: 178 K/UL (ref 130–400)
PO2 ARTERIAL: 53 MM HG (ref 75–108)
POC SAMPLE TYPE: ABNORMAL
POTASSIUM SERPL-SCNC: 4.1 MEQ/L (ref 3.5–5.1)
PRO-BNP: 30 PG/ML
PROTEIN UA: NEGATIVE MG/DL
PROTHROMBIN TIME: 10.2 SEC (ref 9.6–12.3)
RBC # BLD: 4.9 M/UL (ref 4.7–6.1)
SALICYLATE, SERUM: <0.3 MG/DL (ref 15–30)
SODIUM BLD-SCNC: 138 MEQ/L (ref 132–144)
SPECIFIC GRAVITY UA: 1.01 (ref 1–1.03)
TCO2 ARTERIAL: 31 (ref 22–29)
TOTAL CK: 180 U/L (ref 0–190)
TOTAL PROTEIN: 7.2 G/DL (ref 6.4–8.1)
TRIGL SERPL-MCNC: 113 MG/DL (ref 0–200)
TROPONIN: <0.01 NG/ML (ref 0–0.01)
URINE REFLEX TO CULTURE: NORMAL
UROBILINOGEN, URINE: 0.2 E.U./DL
WBC # BLD: 5.7 K/UL (ref 4.8–10.8)

## 2018-11-13 PROCEDURE — 36600 WITHDRAWAL OF ARTERIAL BLOOD: CPT

## 2018-11-13 PROCEDURE — 83880 ASSAY OF NATRIURETIC PEPTIDE: CPT

## 2018-11-13 PROCEDURE — 80307 DRUG TEST PRSMV CHEM ANLYZR: CPT

## 2018-11-13 PROCEDURE — 80053 COMPREHEN METABOLIC PANEL: CPT

## 2018-11-13 PROCEDURE — 2580000003 HC RX 258: Performed by: INTERNAL MEDICINE

## 2018-11-13 PROCEDURE — 86141 C-REACTIVE PROTEIN HS: CPT

## 2018-11-13 PROCEDURE — 80061 LIPID PANEL: CPT

## 2018-11-13 PROCEDURE — 82140 ASSAY OF AMMONIA: CPT

## 2018-11-13 PROCEDURE — 82948 REAGENT STRIP/BLOOD GLUCOSE: CPT

## 2018-11-13 PROCEDURE — 93005 ELECTROCARDIOGRAM TRACING: CPT

## 2018-11-13 PROCEDURE — 85025 COMPLETE CBC W/AUTO DIFF WBC: CPT

## 2018-11-13 PROCEDURE — G0480 DRUG TEST DEF 1-7 CLASSES: HCPCS

## 2018-11-13 PROCEDURE — 82550 ASSAY OF CK (CPK): CPT

## 2018-11-13 PROCEDURE — 84484 ASSAY OF TROPONIN QUANT: CPT

## 2018-11-13 PROCEDURE — 82803 BLOOD GASES ANY COMBINATION: CPT

## 2018-11-13 PROCEDURE — 83735 ASSAY OF MAGNESIUM: CPT

## 2018-11-13 PROCEDURE — 87040 BLOOD CULTURE FOR BACTERIA: CPT

## 2018-11-13 PROCEDURE — 85730 THROMBOPLASTIN TIME PARTIAL: CPT

## 2018-11-13 PROCEDURE — 93010 ELECTROCARDIOGRAM REPORT: CPT | Performed by: INTERNAL MEDICINE

## 2018-11-13 PROCEDURE — 86850 RBC ANTIBODY SCREEN: CPT

## 2018-11-13 PROCEDURE — 6370000000 HC RX 637 (ALT 250 FOR IP): Performed by: INTERNAL MEDICINE

## 2018-11-13 PROCEDURE — 82375 ASSAY CARBOXYHB QUANT: CPT

## 2018-11-13 PROCEDURE — 71046 X-RAY EXAM CHEST 2 VIEWS: CPT

## 2018-11-13 PROCEDURE — 81003 URINALYSIS AUTO W/O SCOPE: CPT

## 2018-11-13 PROCEDURE — 86900 BLOOD TYPING SEROLOGIC ABO: CPT

## 2018-11-13 PROCEDURE — 6360000002 HC RX W HCPCS: Performed by: STUDENT IN AN ORGANIZED HEALTH CARE EDUCATION/TRAINING PROGRAM

## 2018-11-13 PROCEDURE — 86901 BLOOD TYPING SEROLOGIC RH(D): CPT

## 2018-11-13 PROCEDURE — 1210000000 HC MED SURG R&B

## 2018-11-13 PROCEDURE — 99285 EMERGENCY DEPT VISIT HI MDM: CPT

## 2018-11-13 PROCEDURE — 83605 ASSAY OF LACTIC ACID: CPT

## 2018-11-13 PROCEDURE — 85610 PROTHROMBIN TIME: CPT

## 2018-11-13 PROCEDURE — 94664 DEMO&/EVAL PT USE INHALER: CPT

## 2018-11-13 PROCEDURE — 94640 AIRWAY INHALATION TREATMENT: CPT

## 2018-11-13 PROCEDURE — 70450 CT HEAD/BRAIN W/O DYE: CPT

## 2018-11-13 PROCEDURE — 99223 1ST HOSP IP/OBS HIGH 75: CPT | Performed by: INTERNAL MEDICINE

## 2018-11-13 PROCEDURE — 36415 COLL VENOUS BLD VENIPUNCTURE: CPT

## 2018-11-13 PROCEDURE — 2580000003 HC RX 258: Performed by: STUDENT IN AN ORGANIZED HEALTH CARE EDUCATION/TRAINING PROGRAM

## 2018-11-13 RX ORDER — IBUPROFEN 400 MG/1
400 TABLET ORAL EVERY 6 HOURS PRN
Status: DISCONTINUED | OUTPATIENT
Start: 2018-11-13 | End: 2018-11-15 | Stop reason: HOSPADM

## 2018-11-13 RX ORDER — ARIPIPRAZOLE 10 MG/1
15 TABLET ORAL DAILY
Status: DISCONTINUED | OUTPATIENT
Start: 2018-11-13 | End: 2018-11-15 | Stop reason: HOSPADM

## 2018-11-13 RX ORDER — DEXTROSE MONOHYDRATE 50 MG/ML
100 INJECTION, SOLUTION INTRAVENOUS PRN
Status: DISCONTINUED | OUTPATIENT
Start: 2018-11-13 | End: 2018-11-15 | Stop reason: HOSPADM

## 2018-11-13 RX ORDER — NICOTINE POLACRILEX 4 MG
15 LOZENGE BUCCAL PRN
Status: DISCONTINUED | OUTPATIENT
Start: 2018-11-13 | End: 2018-11-15 | Stop reason: HOSPADM

## 2018-11-13 RX ORDER — ONDANSETRON 2 MG/ML
4 INJECTION INTRAMUSCULAR; INTRAVENOUS EVERY 6 HOURS PRN
Status: DISCONTINUED | OUTPATIENT
Start: 2018-11-13 | End: 2018-11-15 | Stop reason: HOSPADM

## 2018-11-13 RX ORDER — ALBUTEROL SULFATE 2.5 MG/3ML
2.5 SOLUTION RESPIRATORY (INHALATION) EVERY 4 HOURS PRN
Status: DISCONTINUED | OUTPATIENT
Start: 2018-11-13 | End: 2018-11-15 | Stop reason: HOSPADM

## 2018-11-13 RX ORDER — PALIPERIDONE 9 MG/1
9 TABLET, EXTENDED RELEASE ORAL NIGHTLY
Status: DISCONTINUED | OUTPATIENT
Start: 2018-11-13 | End: 2018-11-15 | Stop reason: HOSPADM

## 2018-11-13 RX ORDER — DOXYCYCLINE HYCLATE 100 MG/1
100 CAPSULE ORAL 2 TIMES DAILY
COMMUNITY

## 2018-11-13 RX ORDER — SIMVASTATIN 10 MG
10 TABLET ORAL NIGHTLY
Status: DISCONTINUED | OUTPATIENT
Start: 2018-11-13 | End: 2018-11-15 | Stop reason: HOSPADM

## 2018-11-13 RX ORDER — ALBUTEROL SULFATE 2.5 MG/3ML
2.5 SOLUTION RESPIRATORY (INHALATION) EVERY 6 HOURS PRN
Status: DISCONTINUED | OUTPATIENT
Start: 2018-11-13 | End: 2018-11-13

## 2018-11-13 RX ORDER — SODIUM CHLORIDE 0.9 % (FLUSH) 0.9 %
10 SYRINGE (ML) INJECTION PRN
Status: DISCONTINUED | OUTPATIENT
Start: 2018-11-13 | End: 2018-11-15 | Stop reason: HOSPADM

## 2018-11-13 RX ORDER — IPRATROPIUM BROMIDE AND ALBUTEROL SULFATE 2.5; .5 MG/3ML; MG/3ML
3 SOLUTION RESPIRATORY (INHALATION) 3 TIMES DAILY
Status: DISCONTINUED | OUTPATIENT
Start: 2018-11-13 | End: 2018-11-15 | Stop reason: HOSPADM

## 2018-11-13 RX ORDER — SODIUM CHLORIDE 0.9 % (FLUSH) 0.9 %
10 SYRINGE (ML) INJECTION EVERY 12 HOURS SCHEDULED
Status: DISCONTINUED | OUTPATIENT
Start: 2018-11-13 | End: 2018-11-15 | Stop reason: HOSPADM

## 2018-11-13 RX ORDER — DEXTROSE MONOHYDRATE 25 G/50ML
12.5 INJECTION, SOLUTION INTRAVENOUS PRN
Status: DISCONTINUED | OUTPATIENT
Start: 2018-11-13 | End: 2018-11-15 | Stop reason: HOSPADM

## 2018-11-13 RX ORDER — ARIPIPRAZOLE 15 MG/1
15 TABLET ORAL DAILY
COMMUNITY

## 2018-11-13 RX ORDER — VENLAFAXINE HYDROCHLORIDE 75 MG/1
150 CAPSULE, EXTENDED RELEASE ORAL DAILY
Status: DISCONTINUED | OUTPATIENT
Start: 2018-11-13 | End: 2018-11-15 | Stop reason: HOSPADM

## 2018-11-13 RX ADMIN — CEFTRIAXONE SODIUM 1 G: 1 INJECTION, POWDER, FOR SOLUTION INTRAMUSCULAR; INTRAVENOUS at 11:08

## 2018-11-13 RX ADMIN — IPRATROPIUM BROMIDE AND ALBUTEROL SULFATE 3 ML: .5; 3 SOLUTION RESPIRATORY (INHALATION) at 19:18

## 2018-11-13 RX ADMIN — SODIUM CHLORIDE, PRESERVATIVE FREE 10 ML: 5 INJECTION INTRAVENOUS at 20:32

## 2018-11-13 RX ADMIN — PALIPERIDONE 9 MG: 9 TABLET, EXTENDED RELEASE ORAL at 20:32

## 2018-11-13 RX ADMIN — AZITHROMYCIN MONOHYDRATE 500 MG: 500 INJECTION, POWDER, LYOPHILIZED, FOR SOLUTION INTRAVENOUS at 11:24

## 2018-11-13 RX ADMIN — SIMVASTATIN 10 MG: 10 TABLET, FILM COATED ORAL at 20:32

## 2018-11-13 ASSESSMENT — PAIN SCALES - GENERAL: PAINLEVEL_OUTOF10: 4

## 2018-11-13 ASSESSMENT — PAIN DESCRIPTION - DESCRIPTORS: DESCRIPTORS: HEADACHE

## 2018-11-13 ASSESSMENT — PAIN DESCRIPTION - LOCATION: LOCATION: NECK

## 2018-11-13 ASSESSMENT — ENCOUNTER SYMPTOMS: COUGH: 1

## 2018-11-13 NOTE — H&P
obvious abnormality. Eye: Normal external eye, conjunctiva, lids cornea, NIKKO. Neck: supple, symmetrical, trachea midline  Lungs: clear to auscultation bilaterally  Heart: regular rate and rhythm, S1, S2 normal, no murmur, click, rub or gallop  Abdomen: soft, non-tender; bowel sounds normal; no masses,  no organomegaly  Extremities: extremities normal, atraumatic, no cyanosis or edema  Neurologic: sleepy, arousable, mumbling a few words     Recent Labs      11/13/18   0830   WBC  5.7   HGB  16.4   PLT  178     Recent Labs      11/13/18   0823  11/13/18   0830   NA   --   138   K   --   4.1   CL   --   96*   CO2   --   30*   BUN   --   13   CREATININE   --   0.86   GLUCOSE  131  128*   AST   --   15   ALT   --   16   BILITOT   --   0.5   ALKPHOS   --   84     Troponin T:   Recent Labs      11/13/18   0830   TROPONINI  <0.010       ABGs:   Lab Results   Component Value Date    PHART 7.397 11/13/2018    PO2ART 53 11/13/2018    DQD9RLB 48 11/13/2018     INR:   Recent Labs      11/13/18   0830   INR  1.0     URINALYSIS:  Recent Labs      11/13/18   1021   COLORU  Yellow   PHUR  5.5   CLARITYU  Clear   SPECGRAV  1.014   LEUKOCYTESUR  Negative   UROBILINOGEN  0.2   BILIRUBINUR  Negative   BLOODU  Negative   GLUCOSEU  Negative     -----------------------------------------------------------------   Xr Chest Standard (2 Vw)    Result Date: 11/13/2018  EXAMINATION: XR CHEST (2 VW)  CLINICAL HISTORY:  hx pneumonia, confusion COMPARISONS: None  FINDINGS: Two views of the chest are submitted. The cardiac silhouette is of normal size configuration. The mediastinum is unremarkable. Pulmonary vascular unremarkable. Right sided trachea. Small area of atelectasis, groundglass infiltrate left lower lobe. No effusions. No Pneumothoraces.                                                                                    SMALL AREA OF ATELECTASIS, GROUNDGLASS INFILTRATE LEFT LOWER LOBE    Ct Head Wo Contrast    Result Date:

## 2018-11-13 NOTE — ED NOTES
[facility] End on 5 wests accepted report  But needs to clarify the need for one on one and  Getting staff for that  Capability.  Pt to  [place] in er for  A small  Amount of time until status cane be cleared     April LUIS Celestin RN  11/13/18 1128

## 2018-11-13 NOTE — ED NOTES
[address] called  Inquiring of pt. She states that  Pt  Was to be discharged from her facility back to group home but they could not find  Pt to discharge him. He walked himself here to be seen. Pt has h/o schizophrenia and  Has been  Confused and talking to himself and trouble with following commands for the week he has been  There. None of the behavior he presents with is new.  adon to call back  To advise as to how to proceed and  With name of group home     April Dorian Phipps RN  11/13/18 [id number]

## 2018-11-13 NOTE — ED PROVIDER NOTES
[address] [facility] ED  eMERGENCY dEPARTMENT eNCOUnter      Pt Name: Evan Claire  MRN: [id number]  [place] 1960  Date of evaluation: 11/13/2018  Provider: Margarita Singh DO    CHIEF COMPLAINT       Chief Complaint   Patient presents with    Other     unknown why pt is here. pt walkjedinto er lobby seems confused. talking to himself.  states \"I think I was in  a [place]\"  \"My head hurts so bad\" pt holds his left side of his head. pt pt left eye is drooped. pt gait is unsteady. HISTORY OF PRESENT ILLNESS   (Location/Symptom, Timing/Onset,Context/Setting, Quality, Duration, Modifying Factors, Severity)  Note limiting factors. Evan Claire is a 66 y.o. male who presents to the emergency department with complaint of headache. Patient is confused and disoriented. Patient knows his name and place but not date. Patient c/o headache and says he thinks he was in a \"nut house. \"     HPI    NursingNotes were reviewed. REVIEW OF SYSTEMS    (2-9 systems for level 4, 10 or more for level 5)     Review of Systems   Unable to perform ROS: Mental status change   Respiratory: Positive for cough. Neurological: Positive for headaches. Psychiatric/Behavioral: Positive for confusion. Except as noted above the remainder of the review of systems was reviewed and negative.        PAST MEDICAL HISTORY     Past Medical History:   Diagnosis Date    Anxiety     Asthma     [facility] [address]     History of pulmonary embolism     History of seizures     Long term (current) use of anticoagulants 5/12/2015    Schizophrenia ([facility] [address]     Schizophrenia (HCC)     SOB (shortness of breath) 11/17/2016    Tobacco abuse          SURGICALHISTORY       Past Surgical History:   Procedure Laterality Date    APPENDECTOMY           CURRENT MEDICATIONS       Previous Medications    ALBUTEROL SULFATE HFA (PROAIR HFA) 108 (90 BASE) MCG/ACT INHALER    2 puffs by Other route    ASPIRIN 81 MG CHEWABLE TABLET

## 2018-11-14 LAB
ALBUMIN SERPL-MCNC: 3.8 G/DL (ref 3.9–4.9)
ANION GAP SERPL CALCULATED.3IONS-SCNC: 12 MEQ/L (ref 7–13)
BUN BLDV-MCNC: 12 MG/DL (ref 6–20)
C-REACTIVE PROTEIN, HIGH SENSITIVITY: 2.9 MG/L (ref 0–5)
CALCIUM SERPL-MCNC: 9.1 MG/DL (ref 8.6–10.2)
CHLORIDE BLD-SCNC: 100 MEQ/L (ref 98–107)
CO2: 28 MEQ/L (ref 22–29)
CREAT SERPL-MCNC: 0.93 MG/DL (ref 0.7–1.2)
GFR AFRICAN AMERICAN: >60
GFR NON-AFRICAN AMERICAN: >60
GLUCOSE BLD-MCNC: 166 MG/DL (ref 60–115)
GLUCOSE BLD-MCNC: 186 MG/DL (ref 74–109)
GLUCOSE BLD-MCNC: 187 MG/DL (ref 60–115)
GLUCOSE BLD-MCNC: 200 MG/DL (ref 60–115)
GLUCOSE BLD-MCNC: 261 MG/DL (ref 60–115)
HCT VFR BLD CALC: 46.7 % (ref 42–52)
HEMOGLOBIN: 16.5 G/DL (ref 14–18)
MAGNESIUM: 1.7 MG/DL (ref 1.7–2.3)
MCH RBC QN AUTO: 33.5 PG (ref 27–31.3)
MCHC RBC AUTO-ENTMCNC: 35.3 % (ref 33–37)
MCV RBC AUTO: 94.8 FL (ref 80–100)
PDW BLD-RTO: 14 % (ref 11.5–14.5)
PERFORMED ON: ABNORMAL
PHOSPHORUS: 4.2 MG/DL (ref 2.5–4.5)
PLATELET # BLD: 179 K/UL (ref 130–400)
POTASSIUM SERPL-SCNC: 4.6 MEQ/L (ref 3.5–5.1)
PROCALCITONIN: 0.05 NG/ML (ref 0–0.15)
RBC # BLD: 4.93 M/UL (ref 4.7–6.1)
SODIUM BLD-SCNC: 140 MEQ/L (ref 132–144)
WBC # BLD: 6.6 K/UL (ref 4.8–10.8)

## 2018-11-14 PROCEDURE — 6370000000 HC RX 637 (ALT 250 FOR IP): Performed by: CLINICAL NURSE SPECIALIST

## 2018-11-14 PROCEDURE — 2580000003 HC RX 258: Performed by: INTERNAL MEDICINE

## 2018-11-14 PROCEDURE — 6370000000 HC RX 637 (ALT 250 FOR IP): Performed by: INTERNAL MEDICINE

## 2018-11-14 PROCEDURE — 36415 COLL VENOUS BLD VENIPUNCTURE: CPT

## 2018-11-14 PROCEDURE — 1210000000 HC MED SURG R&B

## 2018-11-14 PROCEDURE — 6360000002 HC RX W HCPCS: Performed by: INTERNAL MEDICINE

## 2018-11-14 PROCEDURE — 83735 ASSAY OF MAGNESIUM: CPT

## 2018-11-14 PROCEDURE — 80069 RENAL FUNCTION PANEL: CPT

## 2018-11-14 PROCEDURE — 85027 COMPLETE CBC AUTOMATED: CPT

## 2018-11-14 PROCEDURE — 87040 BLOOD CULTURE FOR BACTERIA: CPT

## 2018-11-14 PROCEDURE — 84145 PROCALCITONIN (PCT): CPT

## 2018-11-14 PROCEDURE — 99232 SBSQ HOSP IP/OBS MODERATE 35: CPT | Performed by: INTERNAL MEDICINE

## 2018-11-14 PROCEDURE — 86141 C-REACTIVE PROTEIN HS: CPT

## 2018-11-14 PROCEDURE — 94640 AIRWAY INHALATION TREATMENT: CPT

## 2018-11-14 RX ORDER — NICOTINE 21 MG/24HR
1 PATCH, TRANSDERMAL 24 HOURS TRANSDERMAL DAILY
Status: DISCONTINUED | OUTPATIENT
Start: 2018-11-14 | End: 2018-11-15 | Stop reason: HOSPADM

## 2018-11-14 RX ADMIN — SIMVASTATIN 10 MG: 10 TABLET, FILM COATED ORAL at 22:52

## 2018-11-14 RX ADMIN — INSULIN LISPRO 1 UNITS: 100 INJECTION, SOLUTION INTRAVENOUS; SUBCUTANEOUS at 17:11

## 2018-11-14 RX ADMIN — IPRATROPIUM BROMIDE AND ALBUTEROL SULFATE 3 ML: .5; 3 SOLUTION RESPIRATORY (INHALATION) at 07:34

## 2018-11-14 RX ADMIN — IBUPROFEN 400 MG: 400 TABLET, FILM COATED ORAL at 09:26

## 2018-11-14 RX ADMIN — ARIPIPRAZOLE 15 MG: 10 TABLET ORAL at 09:25

## 2018-11-14 RX ADMIN — ENOXAPARIN SODIUM 40 MG: 40 INJECTION SUBCUTANEOUS at 09:25

## 2018-11-14 RX ADMIN — IPRATROPIUM BROMIDE AND ALBUTEROL SULFATE 3 ML: .5; 3 SOLUTION RESPIRATORY (INHALATION) at 12:01

## 2018-11-14 RX ADMIN — IPRATROPIUM BROMIDE AND ALBUTEROL SULFATE 3 ML: .5; 3 SOLUTION RESPIRATORY (INHALATION) at 20:01

## 2018-11-14 RX ADMIN — SODIUM CHLORIDE, PRESERVATIVE FREE 10 ML: 5 INJECTION INTRAVENOUS at 09:25

## 2018-11-14 RX ADMIN — VENLAFAXINE HYDROCHLORIDE 150 MG: 75 CAPSULE, EXTENDED RELEASE ORAL at 09:25

## 2018-11-14 RX ADMIN — PALIPERIDONE 9 MG: 9 TABLET, EXTENDED RELEASE ORAL at 22:52

## 2018-11-14 RX ADMIN — INSULIN LISPRO 3 UNITS: 100 INJECTION, SOLUTION INTRAVENOUS; SUBCUTANEOUS at 12:10

## 2018-11-14 RX ADMIN — INSULIN LISPRO 1 UNITS: 100 INJECTION, SOLUTION INTRAVENOUS; SUBCUTANEOUS at 09:25

## 2018-11-14 RX ADMIN — SODIUM CHLORIDE, PRESERVATIVE FREE 10 ML: 5 INJECTION INTRAVENOUS at 22:53

## 2018-11-14 ASSESSMENT — ENCOUNTER SYMPTOMS
STRIDOR: 0
VOMITING: 0
BACK PAIN: 1
WHEEZING: 0
COUGH: 0
SHORTNESS OF BREATH: 0
NAUSEA: 0
EYE PAIN: 0

## 2018-11-14 ASSESSMENT — PAIN SCALES - GENERAL
PAINLEVEL_OUTOF10: 6
PAINLEVEL_OUTOF10: 0
PAINLEVEL_OUTOF10: 6
PAINLEVEL_OUTOF10: 0

## 2018-11-14 ASSESSMENT — PAIN DESCRIPTION - PAIN TYPE: TYPE: CHRONIC PAIN

## 2018-11-14 ASSESSMENT — PAIN DESCRIPTION - LOCATION: LOCATION: BACK

## 2018-11-14 NOTE — CONSULTS
CHIEF COMPLAINT: Low back pain    HISTORY OF PRESENT ILLNESS:      The patient is a 66 y.o. male who was admitted to the hospital for respiratory issues. He has a known L1 fracture. He was supposed to follow-up with Dr. Christiano Chinchilla upon discharge from the hospital but does not seem as though he met that follow-up. He has psych issues.   Orthopedic consult was placed for management of the fracture    Past Medical History:        Diagnosis Date    Anxiety     Asthma     [facility]di[facility]) ([facility] [address]     History of pulmonary embolism     History of seizures     Long term (current) use of anticoagulants 5/12/2015    [facility]ia ([facility] [address]     [facility]     SOB (shortness of breath) 11/17/2016    Tobacco abuse      PastSurgical History:    Past Surgical History:   Procedure Laterality Date    APPENDECTOMY           Medications Prior to Admission:    Current Facility-Administered Medications   Medication Dose Route Frequency Provider Last Rate Last Dose    sodium chloride flush 0.9 % injection 10 mL  10 mL Intravenous 2 times per day Harmony Fournier MD   10 mL at 11/13/18 2032    sodium chloride flush 0.9 % injection 10 mL  10 mL Intravenous PRN Harmony Fournier MD        magnesium hydroxide (MILK OF MAGNESIA) 400 MG/5ML suspension 30 mL  30 mL Oral Daily PRN Harmony Fournier MD        ondansetron (ZOFRAN) injection 4 mg  4 mg Intravenous Q6H PRN Harmony Fournier MD        enoxaparin (LOVENOX) injection 40 mg  40 mg Subcutaneous Daily Harmony Fournier MD        glucose (GLUTOSE) 40 % oral gel 15 g  15 g Oral PRN Harmony Fournier MD        dextrose 50 % solution 12.5 g  12.5 g Intravenous PRN Harmony Fournier MD        glucagon (rDNA) injection 1 mg  1 mg Intramuscular PRN Harmony Fournier MD        dextrose 5 % solution  100 mL/hr Intravenous PRN Harmony Fournier MD        insulin lispro (HUMALOG) injection vial 0-6 Units  0-6 Units Subcutaneous TID WC Harmony Fournier MD        insulin lispro (HUMALOG)

## 2018-11-14 NOTE — PROGRESS NOTES
INPATIENT PROGRESS NOTES    PATIENT NAME: Francisco Javier Aden  MRN: [id number]  SERVICE DATE:  November 14, 2018   SERVICE TIME:  12:17 PM      PRIMARY SERVICE: Pulmonary Disease    CHIEF COMPLAIN: mental status change     INTERVAL HPI: Patient seen and examined at bedside, Interval Notes, orders reviewed. Nursing notes noted  Patient is looking better today. He is less confused able to tell where he is and he lives in group home at [facility]. He is not having shortness of breath , chest pain, cough or fever. OBJECTIVE    Body mass index is 24.68 kg/m². PHYSICAL EXAM:  Vitals:  BP (!) 92/50   Pulse 94   Temp 97.8 °F (36.6 °C) (Oral)   Resp 18   Ht 6' (1.829 m)   Wt 182 lb (82.6 kg)   SpO2 93%   BMI 24.68 kg/m²   General: Alert, awake . comfortable in bed, No distress. appears stated age and cooperative  Head: Atraumatic , Normocephalic   Eyes: PERRL. No sclera icterus. No conjunctival injection. No discharge   ENT: No nasal  discharge. Pharynx clear. lips, teeth, mucosa and gums are normal, tongue protrudes in the midline  Neck:  Trachea midline. No thyromegaly, no JVD, No cervical adenopathy. Chest : Bilaterally symmetrical ,Normal effort,  No accessory muscle use  Lung : . Fair BS bilateral, decreased BS at bases. No Rales. No wheezing. No rhonchi. No dullness on percussion. Heart[de-identified] Normal  rate. Regular rhythm. No mumur ,  Rub or gallop  ABD: Non-tender. Non-distended. No masses. No organmegaly. Normal bowel sounds. No hernia.   Ext : No Pitting both leg , No Cyanosis No clubbing  Neuro: no focal weakness          DATA:   Recent Labs      11/13/18   0830  11/14/18   0530   WBC  5.7  6.6   HGB  16.4  16.5   HCT  46.5  46.7   MCV  95.0  94.8   PLT  178  179     Recent Labs      11/13/18   0830  11/14/18   0530   NA  138  140   K  4.1  4.6   CL  96*  100   CO2  30*  28   BUN  13  12   CREATININE  0.86  0.93   GLUCOSE  128*  186*   CALCIUM  9.4  9.1   PROT  7.2   --    LABALBU  4.3  3.8*   BILITOT  0.5 dislocation or destructive osseous abnormality is seen. There is an old healed fracture near the neck of the fifth metacarpal bone with mild deformity, unchanged. Joint spaces are well maintained. NO ACUTE FRACTURE OR DISLOCATION. REMOTE HEALED FRACTURE OF THE FIFTH METACARPAL BONE. Ct Head Wo Contrast    Result Date: 11/13/2018  HEAD CT WITHOUT CONTRAST: 11/13/2018 CLINICAL HISTORY:  STROKE . Headache and confused. COMPARISON: None available. TECHNIQUE: Spiral unenhanced images were obtained of the head, with routine reconstructions performed. All CT scans at this facility use dose modulation, iterative reconstruction, and/or weight based dosing when appropriate to reduce radiation dose to as low as reasonably achievable. FINDINGS: There is no intracranial hemorrhage, mass effect, midline shift, extra-axial collection,  evidence of hydrocephalus, recent ischemic infarct, or skull fracture identified. There is no significant atrophy or white matter changes, for age. Chronic appearing opacification and mild loss of volume of the right maxillary sinus is most likely related to chronic paranasal sinus disease. The mastoid air cells and other visualized paranasal sinuses are essentially clear. NO ACUTE INTRACRANIAL PROCESS IDENTIFIED. CHRONIC-APPEARING RIGHT MAXILLARY SINUSITIS. Ct Chest Wo Contrast    Result Date: 10/31/2018  CT of the Chest without intravenous contrast medium. History:  Nodule visualized on prior examination. Technical Factors: CT imaging of the chest was obtained and formatted as 5 mm contiguous axial images from the thoracic inlet through the adrenal glands. Sagittal and coronal reconstruction obtained during postprocessing. Intravenous contrast medium:  None Comparison:  Chest radiograph, October 31, 2018, October 28, 2018. CT chest, May 9, 2014. Findings: Right and left lung show no nodules, masses, foci of consolidation, or effusion.  Scarring identified anterior left lung base,

## 2018-11-14 NOTE — CONSULTS
Scheduled Meds:   sodium chloride flush  10 mL Intravenous 2 times per day    [START ON 11/14/2018] enoxaparin  40 mg Subcutaneous Daily    insulin lispro  0-6 Units Subcutaneous TID     insulin lispro  0-3 Units Subcutaneous Nightly    ARIPiprazole  15 mg Oral Daily    ipratropium-albuterol  3 mL Inhalation TID    simvastatin  10 mg Oral Nightly    paliperidone  9 mg Oral Nightly    venlafaxine  150 mg Oral Daily       PRN Meds:sodium chloride flush, magnesium hydroxide, ondansetron, glucose, dextrose, glucagon (rDNA), dextrose, ibuprofen, albuterol    REVIEW OF SYSTEMS:  As in history of present illness  Other 14 point review of system is negative. PHYSICAL EXAM:    Vitals:  /75   Pulse 87   Temp 97.7 °F (36.5 °C) (Oral)   Resp 16   Ht 6' (1.829 m)   Wt 182 lb (82.6 kg)   SpO2 98%   BMI 24.68 kg/m²   General: sleepy but arousable , confused. comfortable in bed, No distress. Head: Atraumatic , Normocephalic   Eyes: PERRL. No sclera icterus. No conjunctival injection. No discharge   ENT: No nasal  discharge. Pharynx clear. Neck:  Trachea midline. No thyromegaly, no JVD, No cervical adenopathy. Chest : Bilaterally symmetrical ,Normal effort,  No accessory muscle use  Lung : . Fair BS bilateral, decreased BS at bases. No Rales. Few scattered wheezing. No rhonchi. No dullness on percussion. Heart[de-identified] Normal  rate. Regular rhythm. No mumur ,  Rub or gallop  ABD: Non-tender. Non-distended. No masses. No organmegaly. Normal bowel sounds. No hernia. Extremities: No pitting in both lower leg , No Cyanosis ,No clubbing  Neuro: did not examine in detail,  no focal weakness   Skin: Warm and dry. No erythema rash on exposed extremities.         Data Review  Recent Labs      11/13/18   0830   WBC  5.7   HGB  16.4   HCT  46.5   PLT  178      Recent Labs      11/13/18   0823  11/13/18   0830   NA   --   138   K   --   4.1   CL   --   96*   CO2   --   30*   BUN   --   13   CREATININE   -- FINDINGS: AP and lateral views were obtained. There is levoscoliosis in the upper dorsal spine apex at C5-C6 level. Disc spaces are fairly well-maintained. Degenerative spurring has developed in the lower cervical spine from C7 through T12. The vertebral  bodies are normal in height and intact and the dorsal spine. Paraspinal soft tissues unremarkable. LEVOSCOLIOSIS. DEGENERATIVE DISEASE. BONES GROSSLY INTACT IN THE DORSAL SPINE. Xr Lumbar Spine (2-3 Views)    Result Date: 10/29/2018  EXAMINATION: Lumbar spine. 3 films CLINICAL HISTORY: Chronic low back pain FINDINGS: AP and lateral views consisting of 3 films were obtained. There is moderate compression deformity of L1 estimated at 25%, with primarily wedging of the anterior vertebral body noted. This is indeterminate in age. No recent previous studies. This was not present on CT abdomen of April 14, 2010. The disc spaces of the lumbar spine are fairly well preserved. No infiltrative bony process seen. Generalized osteoporosis is present. Paraspinal soft tissues unremarkable. 25% COMPRESSION DEFORMITY OF L1, INDETERMINATE IN AGE. OSTEOPOROSIS. Xr Hand Right (min 3 Views)    Result Date: 11/1/2018  X-RAY:  RIGHT HAND, 3 VIEW(S) COMPARISONS:  7/12/2014 CLINICAL HISTORY: PAIN FINDINGS:  No acute fracture, dislocation or destructive osseous abnormality is seen. There is an old healed fracture near the neck of the fifth metacarpal bone with mild deformity, unchanged. Joint spaces are well maintained. NO ACUTE FRACTURE OR DISLOCATION. REMOTE HEALED FRACTURE OF THE FIFTH METACARPAL BONE. Ct Head Wo Contrast    Result Date: 11/13/2018  HEAD CT WITHOUT CONTRAST: 11/13/2018 CLINICAL HISTORY:  STROKE . Headache and confused. COMPARISON: None available. TECHNIQUE: Spiral unenhanced images were obtained of the head, with routine reconstructions performed.  All CT scans at this facility use dose modulation, iterative reconstruction, and/or weight based dosing when appropriate to reduce radiation dose to as low as reasonably achievable. FINDINGS: There is no intracranial hemorrhage, mass effect, midline shift, extra-axial collection,  evidence of hydrocephalus, recent ischemic infarct, or skull fracture identified. There is no significant atrophy or white matter changes, for age. Chronic appearing opacification and mild loss of volume of the right maxillary sinus is most likely related to chronic paranasal sinus disease. The mastoid air cells and other visualized paranasal sinuses are essentially clear. NO ACUTE INTRACRANIAL PROCESS IDENTIFIED. CHRONIC-APPEARING RIGHT MAXILLARY SINUSITIS. Ct Chest Wo Contrast    Result Date: 10/31/2018  CT of the Chest without intravenous contrast medium. History:  Nodule visualized on prior examination. Technical Factors: CT imaging of the chest was obtained and formatted as 5 mm contiguous axial images from the thoracic inlet through the adrenal glands. Sagittal and coronal reconstruction obtained during postprocessing. Intravenous contrast medium:  None Comparison:  Chest radiograph, October 31, 2018, October 28, 2018. CT chest, May 9, 2014. Findings: Right and left lung show no nodules, masses, foci of consolidation, or effusion. Scarring identified anterior left lung base, and was present in 2014. 8.7 mm precarinal lymph node identified. No hilar or axillary lymph node enlargement is identified. Limited imaging upper abdomen shows adrenal glands without anomaly. 1.8 cm splenule medial to anterior spleen. No osteoblastic, and no osteolytic lesions. Age-indeterminate compression fracture, L1. Scarring left lung base. Age-indeterminate L1 compression fracture. All CT scans at this facility use dose modulation, iterative reconstruction, and/or weight based dosing when appropriate to reduce radiation dose to as low as reasonably achievable.     Mri Lumbar Spine Wo Contrast    Result Date: 10/31/2018  EXAMINATION: MRI scarring. Bronchopneumonia should be excluded clinically. There is no cardiomegaly, significant pleural effusion, vascular congestion, pneumothorax, or displaced fractures identified. POOR INSPIRATION WITH MILD PROBABLE BIBASILAR ATELECTASIS AND/OR SCARRING. BRONCHOPNEUMONIA SHOULD BE EXCLUDED CLINICALLY. Xr Skull (<4 Views)    Result Date: 10/31/2018  EXAMINATION: XR SKULL (<4 VIEWS) CLINICAL HISTORY:  mri clearance  COMPARISONS: None available. FINDINGS: Frontal and lateral views of the skull were obtained. There are no metallic foreign bodies within the field-of-view, including the head and neck and superior mediastinum. There is no acute process visible. CONCLUSION: NO RADIOPAQUE FOREIGN BODIES WITHIN THE FIELD-OF-VIEW. Assessment and  plan:     1. Left lower lobe pneumonia versus atelectasis. Pneumonia clinically less likely. 2.  Asthma without exacerbation  2. Mental status changes. 3.  Schizophrenia  4. Diabetes mellitus  5. Back pain, L1 fracture  6. Tobacco abuse      Patient is currently on bronchodilator with DuoNeb 3 times a day and albuterol every 2 hours when necessary. he is on room air and oxygen saturation is 98% he had ABG done earlier shows hypoxia but now it seems improved. ABG done earlier showed pH 7.39 with PCO2 48 PO2 53 bicarb 29.5 and saturation 87%. Patient had CT chest done in the past shows left lower lobe scarring, patient has no cough or sputum production, no fever or chills. WBC is within normal range. Clinically pneumonia unlikely most likely scarring or atelectasis. Do not recommend antibiotic at this time. At this time continue bronchodilator therapy as requested.   Will follow    Thank you for this consult    Electronically signed by Benita Triplett MD, FCCP on 11/13/2018 at 8:49 PM

## 2018-11-14 NOTE — CARE COORDINATION
SPOKE WITH VIVIAN FROM [facility]. THEY CAN PICK PATIENT UP AT 12PM TOMORROW. SHE DID STATE THERE IS NOT AN O2 TANK THERE AT THE HOME. PATIENT WAS REFUSING TO WEAR IT AND HE WAS THROWING THE TANKS AWAY. HE WAS AFRAID THEY WERE GOING TO BLOW UP. SHE REQUESTED THE D/ INFO TO BE FAXED [phone], WHICH WAS DONE.  Electronically signed by Ac Adams RN on 11/14/2018 at 2:52 PM

## 2018-11-15 VITALS
WEIGHT: 182 LBS | DIASTOLIC BLOOD PRESSURE: 75 MMHG | RESPIRATION RATE: 18 BRPM | BODY MASS INDEX: 24.65 KG/M2 | HEIGHT: 72 IN | HEART RATE: 95 BPM | OXYGEN SATURATION: 95 % | TEMPERATURE: 98.1 F | SYSTOLIC BLOOD PRESSURE: 110 MMHG

## 2018-11-15 LAB
ALBUMIN SERPL-MCNC: 3.9 G/DL (ref 3.9–4.9)
ANION GAP SERPL CALCULATED.3IONS-SCNC: 12 MEQ/L (ref 7–13)
BUN BLDV-MCNC: 13 MG/DL (ref 6–20)
CALCIUM SERPL-MCNC: 8.8 MG/DL (ref 8.6–10.2)
CHLORIDE BLD-SCNC: 96 MEQ/L (ref 98–107)
CO2: 26 MEQ/L (ref 22–29)
CREAT SERPL-MCNC: 0.93 MG/DL (ref 0.7–1.2)
GFR AFRICAN AMERICAN: >60
GFR NON-AFRICAN AMERICAN: >60
GLUCOSE BLD-MCNC: 159 MG/DL (ref 74–109)
GLUCOSE BLD-MCNC: 197 MG/DL (ref 60–115)
HCT VFR BLD CALC: 43.6 % (ref 42–52)
HEMOGLOBIN: 15.2 G/DL (ref 14–18)
MAGNESIUM: 1.7 MG/DL (ref 1.7–2.3)
MCH RBC QN AUTO: 33.3 PG (ref 27–31.3)
MCHC RBC AUTO-ENTMCNC: 34.8 % (ref 33–37)
MCV RBC AUTO: 95.7 FL (ref 80–100)
PDW BLD-RTO: 13.6 % (ref 11.5–14.5)
PERFORMED ON: ABNORMAL
PHOSPHORUS: 3.8 MG/DL (ref 2.5–4.5)
PLATELET # BLD: 168 K/UL (ref 130–400)
POTASSIUM SERPL-SCNC: 4.1 MEQ/L (ref 3.5–5.1)
RBC # BLD: 4.55 M/UL (ref 4.7–6.1)
SODIUM BLD-SCNC: 134 MEQ/L (ref 132–144)
WBC # BLD: 6.6 K/UL (ref 4.8–10.8)

## 2018-11-15 PROCEDURE — 94640 AIRWAY INHALATION TREATMENT: CPT

## 2018-11-15 PROCEDURE — 80069 RENAL FUNCTION PANEL: CPT

## 2018-11-15 PROCEDURE — 83735 ASSAY OF MAGNESIUM: CPT

## 2018-11-15 PROCEDURE — 6370000000 HC RX 637 (ALT 250 FOR IP): Performed by: INTERNAL MEDICINE

## 2018-11-15 PROCEDURE — 94760 N-INVAS EAR/PLS OXIMETRY 1: CPT

## 2018-11-15 PROCEDURE — 85027 COMPLETE CBC AUTOMATED: CPT

## 2018-11-15 PROCEDURE — 36415 COLL VENOUS BLD VENIPUNCTURE: CPT

## 2018-11-15 RX ADMIN — VENLAFAXINE HYDROCHLORIDE 150 MG: 75 CAPSULE, EXTENDED RELEASE ORAL at 11:02

## 2018-11-15 RX ADMIN — INSULIN LISPRO 1 UNITS: 100 INJECTION, SOLUTION INTRAVENOUS; SUBCUTANEOUS at 11:04

## 2018-11-15 RX ADMIN — ARIPIPRAZOLE 15 MG: 10 TABLET ORAL at 11:02

## 2018-11-15 RX ADMIN — IPRATROPIUM BROMIDE AND ALBUTEROL SULFATE 3 ML: .5; 3 SOLUTION RESPIRATORY (INHALATION) at 07:34

## 2018-11-15 ASSESSMENT — PAIN SCALES - GENERAL: PAINLEVEL_OUTOF10: 0

## 2018-11-15 NOTE — PROGRESS NOTES
Pt in bed with 1:1 at the bedside. Hard to understand pt, mumbling when he speaks. Denies any needs. Meds given without difficulty. Will continue to monitor.      Electronically signed by Jose Angel Galvan RN on 11/15/2018 at 2:11 AM

## 2018-11-18 LAB
BLOOD CULTURE, ROUTINE: NORMAL
CULTURE, BLOOD 2: NORMAL

## 2018-11-19 LAB — CULTURE, BLOOD 2: NORMAL

## 2019-01-07 ENCOUNTER — APPOINTMENT (OUTPATIENT)
Dept: CT IMAGING | Age: 59
End: 2019-01-07
Payer: MEDICAID

## 2019-01-07 ENCOUNTER — HOSPITAL ENCOUNTER (EMERGENCY)
Age: 59
Discharge: HOME | End: 2019-01-07
Attending: EMERGENCY MEDICINE
Payer: MEDICAID

## 2019-01-07 VITALS
WEIGHT: 200 LBS | OXYGEN SATURATION: 96 % | DIASTOLIC BLOOD PRESSURE: 72 MMHG | TEMPERATURE: 98.1 F | HEIGHT: 66 IN | BODY MASS INDEX: 32.14 KG/M2 | HEART RATE: 95 BPM | SYSTOLIC BLOOD PRESSURE: 113 MMHG | RESPIRATION RATE: 16 BRPM

## 2019-01-07 DIAGNOSIS — Z00.00 WELL ADULT HEALTH CHECK: Primary | ICD-10-CM

## 2019-01-07 LAB
ALBUMIN SERPL-MCNC: 3.9 G/DL (ref 3.9–4.9)
ALP BLD-CCNC: 85 U/L (ref 35–104)
ALT SERPL-CCNC: 16 U/L (ref 0–41)
ANION GAP SERPL CALCULATED.3IONS-SCNC: 13 MEQ/L (ref 7–13)
AST SERPL-CCNC: 27 U/L (ref 0–40)
BASOPHILS ABSOLUTE: 0.1 K/UL (ref 0–0.2)
BASOPHILS RELATIVE PERCENT: 0.7 %
BILIRUB SERPL-MCNC: 0.7 MG/DL (ref 0–1.2)
BUN BLDV-MCNC: 13 MG/DL (ref 6–20)
CALCIUM SERPL-MCNC: 9.1 MG/DL (ref 8.6–10.2)
CHLORIDE BLD-SCNC: 95 MEQ/L (ref 98–107)
CO2: 28 MEQ/L (ref 22–29)
CREAT SERPL-MCNC: 1 MG/DL (ref 0.7–1.2)
D DIMER: 0.51 MG/L FEU (ref 0–0.5)
EKG ATRIAL RATE: 118 BPM
EKG P AXIS: 53 DEGREES
EKG P-R INTERVAL: 126 MS
EKG Q-T INTERVAL: 318 MS
EKG QRS DURATION: 92 MS
EKG QTC CALCULATION (BAZETT): 445 MS
EKG R AXIS: 30 DEGREES
EKG T AXIS: 50 DEGREES
EKG VENTRICULAR RATE: 118 BPM
EOSINOPHILS ABSOLUTE: 0.1 K/UL (ref 0–0.7)
EOSINOPHILS RELATIVE PERCENT: 0.5 %
ETHANOL PERCENT: NORMAL G/DL
ETHANOL: <10 MG/DL (ref 0–0.08)
GFR AFRICAN AMERICAN: >60
GFR NON-AFRICAN AMERICAN: >60
GLOBULIN: 2.8 G/DL (ref 2.3–3.5)
GLUCOSE BLD-MCNC: 196 MG/DL (ref 74–109)
HCT VFR BLD CALC: 46.3 % (ref 42–52)
HEMOGLOBIN: 16.1 G/DL (ref 14–18)
LACTIC ACID: 1.4 MMOL/L (ref 0.5–2.2)
LYMPHOCYTES ABSOLUTE: 1.5 K/UL (ref 1–4.8)
LYMPHOCYTES RELATIVE PERCENT: 14.2 %
MCH RBC QN AUTO: 33.4 PG (ref 27–31.3)
MCHC RBC AUTO-ENTMCNC: 34.8 % (ref 33–37)
MCV RBC AUTO: 95.9 FL (ref 80–100)
MONOCYTES ABSOLUTE: 0.9 K/UL (ref 0.2–0.8)
MONOCYTES RELATIVE PERCENT: 8.2 %
NEUTROPHILS ABSOLUTE: 8.1 K/UL (ref 1.4–6.5)
NEUTROPHILS RELATIVE PERCENT: 76.4 %
PDW BLD-RTO: 13.5 % (ref 11.5–14.5)
PLATELET # BLD: 168 K/UL (ref 130–400)
POTASSIUM SERPL-SCNC: 4.5 MEQ/L (ref 3.5–5.1)
RBC # BLD: 4.83 M/UL (ref 4.7–6.1)
SODIUM BLD-SCNC: 136 MEQ/L (ref 132–144)
TOTAL PROTEIN: 6.7 G/DL (ref 6.4–8.1)
TROPONIN: <0.01 NG/ML (ref 0–0.01)
WBC # BLD: 10.6 K/UL (ref 4.8–10.8)

## 2019-01-07 PROCEDURE — 96374 THER/PROPH/DIAG INJ IV PUSH: CPT

## 2019-01-07 PROCEDURE — 2580000003 HC RX 258: Performed by: PERSONAL EMERGENCY RESPONSE ATTENDANT

## 2019-01-07 PROCEDURE — 93010 ELECTROCARDIOGRAM REPORT: CPT | Performed by: INTERNAL MEDICINE

## 2019-01-07 PROCEDURE — G0480 DRUG TEST DEF 1-7 CLASSES: HCPCS

## 2019-01-07 PROCEDURE — 2500000003 HC RX 250 WO HCPCS: Performed by: PERSONAL EMERGENCY RESPONSE ATTENDANT

## 2019-01-07 PROCEDURE — 99283 EMERGENCY DEPT VISIT LOW MDM: CPT

## 2019-01-07 PROCEDURE — 80053 COMPREHEN METABOLIC PANEL: CPT

## 2019-01-07 PROCEDURE — 71275 CT ANGIOGRAPHY CHEST: CPT

## 2019-01-07 PROCEDURE — 93005 ELECTROCARDIOGRAM TRACING: CPT

## 2019-01-07 PROCEDURE — 85379 FIBRIN DEGRADATION QUANT: CPT

## 2019-01-07 PROCEDURE — 84484 ASSAY OF TROPONIN QUANT: CPT

## 2019-01-07 PROCEDURE — 83605 ASSAY OF LACTIC ACID: CPT

## 2019-01-07 PROCEDURE — 85025 COMPLETE CBC W/AUTO DIFF WBC: CPT

## 2019-01-07 PROCEDURE — 6360000004 HC RX CONTRAST MEDICATION: Performed by: RADIOLOGY

## 2019-01-07 PROCEDURE — 70450 CT HEAD/BRAIN W/O DYE: CPT

## 2019-01-07 PROCEDURE — 36415 COLL VENOUS BLD VENIPUNCTURE: CPT

## 2019-01-07 RX ORDER — 0.9 % SODIUM CHLORIDE 0.9 %
1000 INTRAVENOUS SOLUTION INTRAVENOUS ONCE
Status: COMPLETED | OUTPATIENT
Start: 2019-01-07 | End: 2019-01-07

## 2019-01-07 RX ORDER — DILTIAZEM HYDROCHLORIDE 5 MG/ML
15 INJECTION INTRAVENOUS ONCE
Status: COMPLETED | OUTPATIENT
Start: 2019-01-07 | End: 2019-01-07

## 2019-01-07 RX ADMIN — SODIUM CHLORIDE 1000 ML: 9 INJECTION, SOLUTION INTRAVENOUS at 01:31

## 2019-01-07 RX ADMIN — DILTIAZEM HYDROCHLORIDE 15 MG: 5 INJECTION INTRAVENOUS at 03:24

## 2019-01-07 RX ADMIN — IOPAMIDOL 100 ML: 612 INJECTION, SOLUTION INTRAVENOUS at 02:00

## 2019-01-07 RX ADMIN — SODIUM CHLORIDE 1000 ML: 9 INJECTION, SOLUTION INTRAVENOUS at 03:25

## 2019-01-07 ASSESSMENT — ENCOUNTER SYMPTOMS
SHORTNESS OF BREATH: 0
BLOOD IN STOOL: 0
ABDOMINAL PAIN: 0
VOMITING: 0
DIARRHEA: 0
NAUSEA: 0
RHINORRHEA: 0
COLOR CHANGE: 0
COUGH: 0
SORE THROAT: 0

## 2019-01-07 ASSESSMENT — PAIN DESCRIPTION - ORIENTATION: ORIENTATION: LOWER

## 2019-01-07 ASSESSMENT — PAIN DESCRIPTION - PAIN TYPE: TYPE: CHRONIC PAIN

## 2019-01-07 ASSESSMENT — PAIN SCALES - GENERAL: PAINLEVEL_OUTOF10: 6

## 2019-01-07 ASSESSMENT — PAIN DESCRIPTION - LOCATION: LOCATION: BACK

## 2019-01-07 ASSESSMENT — PAIN DESCRIPTION - DESCRIPTORS: DESCRIPTORS: ACHING

## 2019-09-07 ENCOUNTER — HOSPITAL ENCOUNTER (EMERGENCY)
Age: 59
Discharge: HOME | End: 2019-09-07
Attending: FAMILY MEDICINE

## 2019-09-07 ENCOUNTER — APPOINTMENT (OUTPATIENT)
Dept: GENERAL RADIOLOGY | Age: 59
End: 2019-09-07

## 2019-09-07 VITALS
SYSTOLIC BLOOD PRESSURE: 113 MMHG | WEIGHT: 205 LBS | HEIGHT: 67 IN | DIASTOLIC BLOOD PRESSURE: 68 MMHG | OXYGEN SATURATION: 98 % | HEART RATE: 99 BPM | RESPIRATION RATE: 18 BRPM | TEMPERATURE: 98.3 F | BODY MASS INDEX: 32.18 KG/M2

## 2019-09-07 DIAGNOSIS — J44.1 COPD EXACERBATION (HCC): Primary | ICD-10-CM

## 2019-09-07 LAB
ALBUMIN SERPL-MCNC: 4.2 G/DL (ref 3.5–4.6)
ALP BLD-CCNC: 92 U/L (ref 35–104)
ALT SERPL-CCNC: 21 U/L (ref 0–41)
ANION GAP SERPL CALCULATED.3IONS-SCNC: 12 MEQ/L (ref 9–15)
AST SERPL-CCNC: 24 U/L (ref 0–40)
BASOPHILS ABSOLUTE: 0.1 K/UL (ref 0–0.2)
BASOPHILS RELATIVE PERCENT: 0.8 %
BILIRUB SERPL-MCNC: 0.7 MG/DL (ref 0.2–0.7)
BUN BLDV-MCNC: 15 MG/DL (ref 6–20)
CALCIUM SERPL-MCNC: 9 MG/DL (ref 8.5–9.9)
CHLORIDE BLD-SCNC: 95 MEQ/L (ref 95–107)
CO2: 30 MEQ/L (ref 20–31)
CREAT SERPL-MCNC: 1.02 MG/DL (ref 0.7–1.2)
EOSINOPHILS ABSOLUTE: 0.1 K/UL (ref 0–0.7)
EOSINOPHILS RELATIVE PERCENT: 0.7 %
ETHANOL PERCENT: NORMAL G/DL
ETHANOL: <10 MG/DL (ref 0–0.08)
GFR AFRICAN AMERICAN: >60
GFR NON-AFRICAN AMERICAN: >60
GLOBULIN: 3.1 G/DL (ref 2.3–3.5)
GLUCOSE BLD-MCNC: 224 MG/DL (ref 70–99)
HCT VFR BLD CALC: 48.1 % (ref 42–52)
HEMOGLOBIN: 16.6 G/DL (ref 14–18)
LYMPHOCYTES ABSOLUTE: 1.9 K/UL (ref 1–4.8)
LYMPHOCYTES RELATIVE PERCENT: 23.3 %
MCH RBC QN AUTO: 33.4 PG (ref 27–31.3)
MCHC RBC AUTO-ENTMCNC: 34.5 % (ref 33–37)
MCV RBC AUTO: 97 FL (ref 80–100)
MONOCYTES ABSOLUTE: 0.7 K/UL (ref 0.2–0.8)
MONOCYTES RELATIVE PERCENT: 8.6 %
NEUTROPHILS ABSOLUTE: 5.3 K/UL (ref 1.4–6.5)
NEUTROPHILS RELATIVE PERCENT: 66.6 %
PDW BLD-RTO: 13.9 % (ref 11.5–14.5)
PLATELET # BLD: 203 K/UL (ref 130–400)
POTASSIUM SERPL-SCNC: 4.5 MEQ/L (ref 3.4–4.9)
RBC # BLD: 4.95 M/UL (ref 4.7–6.1)
SODIUM BLD-SCNC: 137 MEQ/L (ref 135–144)
TOTAL PROTEIN: 7.3 G/DL (ref 6.3–8)
WBC # BLD: 8 K/UL (ref 4.8–10.8)

## 2019-09-07 PROCEDURE — 6370000000 HC RX 637 (ALT 250 FOR IP): Performed by: FAMILY MEDICINE

## 2019-09-07 PROCEDURE — 71045 X-RAY EXAM CHEST 1 VIEW: CPT

## 2019-09-07 PROCEDURE — 6360000002 HC RX W HCPCS: Performed by: FAMILY MEDICINE

## 2019-09-07 PROCEDURE — 80053 COMPREHEN METABOLIC PANEL: CPT

## 2019-09-07 PROCEDURE — G0480 DRUG TEST DEF 1-7 CLASSES: HCPCS

## 2019-09-07 PROCEDURE — 2580000003 HC RX 258: Performed by: FAMILY MEDICINE

## 2019-09-07 PROCEDURE — 36415 COLL VENOUS BLD VENIPUNCTURE: CPT

## 2019-09-07 PROCEDURE — 96374 THER/PROPH/DIAG INJ IV PUSH: CPT

## 2019-09-07 PROCEDURE — 85025 COMPLETE CBC W/AUTO DIFF WBC: CPT

## 2019-09-07 PROCEDURE — 99285 EMERGENCY DEPT VISIT HI MDM: CPT

## 2019-09-07 RX ORDER — ALBUTEROL SULFATE 90 UG/1
AEROSOL, METERED RESPIRATORY (INHALATION)
Qty: 1 INHALER | Refills: 1 | Status: SHIPPED | OUTPATIENT
Start: 2019-09-07

## 2019-09-07 RX ORDER — AZITHROMYCIN 500 MG/1
500 TABLET, FILM COATED ORAL DAILY
Qty: 3 TABLET | Refills: 0 | Status: SHIPPED | OUTPATIENT
Start: 2019-09-07 | End: 2019-09-10

## 2019-09-07 RX ORDER — PREDNISONE 20 MG/1
60 TABLET ORAL ONCE
Status: COMPLETED | OUTPATIENT
Start: 2019-09-07 | End: 2019-09-07

## 2019-09-07 RX ORDER — ONDANSETRON 2 MG/ML
4 INJECTION INTRAMUSCULAR; INTRAVENOUS ONCE
Status: COMPLETED | OUTPATIENT
Start: 2019-09-07 | End: 2019-09-07

## 2019-09-07 RX ORDER — METHYLPREDNISOLONE 4 MG/1
TABLET ORAL
Qty: 1 KIT | Refills: 0 | Status: SHIPPED | OUTPATIENT
Start: 2019-09-07 | End: 2019-09-13

## 2019-09-07 RX ORDER — ALBUTEROL SULFATE 2.5 MG/3ML
2.5 SOLUTION RESPIRATORY (INHALATION)
Status: DISCONTINUED | OUTPATIENT
Start: 2019-09-07 | End: 2019-09-07 | Stop reason: HOSPADM

## 2019-09-07 RX ORDER — IPRATROPIUM BROMIDE AND ALBUTEROL SULFATE 2.5; .5 MG/3ML; MG/3ML
1 SOLUTION RESPIRATORY (INHALATION) ONCE
Status: COMPLETED | OUTPATIENT
Start: 2019-09-07 | End: 2019-09-07

## 2019-09-07 RX ORDER — 0.9 % SODIUM CHLORIDE 0.9 %
1000 INTRAVENOUS SOLUTION INTRAVENOUS ONCE
Status: COMPLETED | OUTPATIENT
Start: 2019-09-07 | End: 2019-09-07

## 2019-09-07 RX ADMIN — PREDNISONE 60 MG: 20 TABLET ORAL at 12:00

## 2019-09-07 RX ADMIN — ALBUTEROL SULFATE 2.5 MG: 2.5 SOLUTION RESPIRATORY (INHALATION) at 12:15

## 2019-09-07 RX ADMIN — SODIUM CHLORIDE 1000 ML: 9 INJECTION, SOLUTION INTRAVENOUS at 10:41

## 2019-09-07 RX ADMIN — ONDANSETRON 4 MG: 2 INJECTION INTRAMUSCULAR; INTRAVENOUS at 10:41

## 2019-09-07 RX ADMIN — IPRATROPIUM BROMIDE AND ALBUTEROL SULFATE 1 AMPULE: .5; 3 SOLUTION RESPIRATORY (INHALATION) at 12:01

## 2019-09-07 ASSESSMENT — ENCOUNTER SYMPTOMS
SWOLLEN GLANDS: 0
WHEEZING: 1
COUGH: 1
HEMOPTYSIS: 0
SPUTUM PRODUCTION: 0
SORE THROAT: 0
VOMITING: 0
ABDOMINAL PAIN: 0
SHORTNESS OF BREATH: 1

## 2019-09-07 ASSESSMENT — PAIN DESCRIPTION - LOCATION: LOCATION: GENERALIZED

## 2019-09-07 ASSESSMENT — PAIN SCALES - GENERAL: PAINLEVEL_OUTOF10: 5

## 2019-09-07 NOTE — ED NOTES
Pt given discharge instructions, states understanding, pt ambulated to exit independently with steady gait     Gildardo Alexis RN  09/07/19 [id number]

## 2019-09-07 NOTE — CARE COORDINATION
CALL PLACED TO [facility], SPOKE WITH CADEN (STAFF MEMBER). PT RESIDES AT [address] THEY ARE UNABLE TO COME  PATIENT, BUT STATE HE IS APPROPRIATE FOR [facility] TRANSPORTATION. PT DOES NOT HAVE INSURANCE FOR TRANSPORTATION. CALL PLACED TO SAFE AND RELIABLE.

## 2019-09-07 NOTE — ED PROVIDER NOTES
tobacco use        NursingNotes were reviewed. REVIEW OF SYSTEMS    (2-9 systems for level 4, 10 or more for level 5)     Review of Systems   Constitutional: Negative for diaphoresis and fever. HENT: Negative for ear pain and sore throat. Respiratory: Positive for cough, shortness of breath and wheezing. Negative for hemoptysis and sputum production. Cardiovascular: Negative for chest pain, claudication, syncope and PND. Gastrointestinal: Negative for abdominal pain and vomiting. Musculoskeletal: Negative for neck pain. Skin: Negative for rash. Neurological: Negative for headaches. Except as noted above the remainder of the review of systems was reviewed and negative.        PAST MEDICAL HISTORY     Past Medical History:   Diagnosis Date    Anxiety     Asthma     [facility]     History of pulmonary embolism     History of seizures     Hyperlipidemia     Hypertension     Long term (current) use of anticoagulants 5/12/2015    [facility]ia [facility]     Schizophrenia ([facility]     SOB (shortness of breath) 11/17/2016    Tobacco abuse          SURGICALHISTORY       Past Surgical History:   Procedure Laterality Date    APPENDECTOMY      BACK SURGERY           CURRENT MEDICATIONS       Discharge Medication List as of 9/7/2019 12:28 PM      CONTINUE these medications which have NOT CHANGED    Details   ARIPiprazole (ABILIFY) 15 MG tablet Take 15 mg by mouth dailyHistorical Med      doxycycline hyclate (VIBRAMYCIN) 100 MG capsule Take 100 mg by mouth 2 times dailyHistorical Med      lisinopril (PRINIVIL;ZESTRIL) 2.5 MG tablet Take 1 tablet by mouth daily, Disp-30 tablet, R-3Print      diltiazem (CARDIZEM) 30 MG tablet Take 1 tablet by mouth every 12 hours, Disp-60 tablet, R-3Print      nicotine (NICODERM CQ) 21 MG/24HR Place 1 patch onto the skin daily, Disp-30 patch, R-3DC to SNF      metFORMIN (GLUCOPHAGE) 500 MG tablet Take 1 tablet by mouth 2 times daily (with meals), Disp-60 tablet, R-3DC to SNF      linagliptin (TRADJENTA) 5 MG tablet Take 1 tablet by mouth daily, Disp-30 tabletDC to SNF      ipratropium-albuterol (DUONEB) 0.5-2.5 (3) MG/3ML SOLN nebulizer solution Inhale 3 mLs into the lungs three times daily, Disp-360 mLDC to SNF      venlafaxine (EFFEXOR XR) 75 MG extended release capsule Take 1 capsule by mouth daily 2 TABS Q HS, Disp-30 capsule, R-6DC to SNF      paliperidone (INVEGA) 9 MG extended release tablet Take 1 tablet by mouth daily, Disp-30 tablet, R-3DC to SNF      !! albuterol sulfate HFA (PROAIR HFA) 108 (90 BASE) MCG/ACT inhaler 2 puffs by Other route      lovastatin (MEVACOR) 10 MG tablet Take 10 mg by mouth nightly Historical Med      magnesium oxide (MAGOX 400) 400 (241.3 MG) MG TABS tablet Take 400 mg by mouth 2 times daily Historical Med       !! - Potential duplicate medications found. Please discuss with provider. ALLERGIES     Acetaminophen and Pcn [penicillins]    FAMILY HISTORY     History reviewed. No pertinent family history.        SOCIAL HISTORY       Social History     Socioeconomic History    Marital status: Single     Spouse name: None    Number of children: None    Years of education: None    Highest education level: None   Occupational History    None   Social Needs    Financial resource strain: None    Food insecurity:     Worry: None     Inability: None    Transportation needs:     Medical: None     Non-medical: None   Tobacco Use    Smoking status: Current Every Day Smoker     Packs/day: 1.00     Years: 14.00     Pack years: 14.00     Types: Cigarettes    Smokeless tobacco: Never Used   Substance and Sexual Activity    Alcohol use: No    Drug use: No    Sexual activity: None   Lifestyle    Physical activity:     Days per week: None     Minutes per session: None    Stress: None   Relationships    Social connections:     Talks on phone: None     Gets together: None     Attends [employer] service: None     Active

## 2020-08-06 LAB
SARS-COV-2, PCR: NOT DETECTED
SPECIMEN SOURCE: NORMAL

## 2020-08-11 LAB
ANION GAP SERPL CALCULATED.3IONS-SCNC: 12 MMOL/L (ref 10–20)
BICARBONATE: 33 MMOL/L (ref 21–32)
CALCIUM SERPL-MCNC: 8.6 MG/DL (ref 8.6–10.3)
CHLORIDE BLD-SCNC: 95 MMOL/L (ref 98–107)
CREAT SERPL-MCNC: 0.88 MG/DL (ref 0.5–1.3)
ERYTHROCYTE [DISTWIDTH] IN BLOOD BY AUTOMATED COUNT: 14.6 % (ref 11.5–14)
GFR AFRICAN AMERICAN: >60 ML/MIN/1.73M2
GFR NON-AFRICAN AMERICAN: >60 ML/MIN/1.73M2
GLUCOSE: 280 MG/DL (ref 74–99)
HCT VFR BLD CALC: 44.3 % (ref 41–52)
HEMOGLOBIN: 14.9 G/DL (ref 13.5–17)
MCHC RBC AUTO-ENTMCNC: 33.6 G/DL (ref 32–36)
MCV RBC AUTO: 99 FL (ref 80–100)
PLATELET # BLD: 148 X10E9/L (ref 150–450)
POTASSIUM SERPL-SCNC: 4.8 MMOL/L (ref 3.5–5.3)
RBC # BLD: 4.49 X10E12/L (ref 4.5–5.9)
SODIUM BLD-SCNC: 135 MMOL/L (ref 136–145)
UREA NITROGEN: 25 MG/DL (ref 6–23)
WBC: 5.5 X10E9/L (ref 4.4–11.3)

## 2020-11-03 PROBLEM — J18.9 PNEUMONIA OF LEFT LOWER LOBE DUE TO INFECTIOUS ORGANISM: Status: RESOLVED | Noted: 2020-11-03 | Resolved: 2020-11-03

## 2022-12-05 NOTE — PLAN OF CARE
Problem: Airway Clearance - Ineffective:  Goal: Ability to maintain a clear airway will improve  Ability to maintain a clear airway will improve   Outcome: Ongoing      Problem: Mobility - Impaired:  Goal: Mobility will improve  Mobility will improve   Outcome: Ongoing
ADMIT

## 2023-02-25 ENCOUNTER — APPOINTMENT (OUTPATIENT)
Dept: GENERAL RADIOLOGY | Age: 63
End: 2023-02-25
Payer: MEDICARE

## 2023-02-25 ENCOUNTER — APPOINTMENT (OUTPATIENT)
Dept: CT IMAGING | Age: 63
End: 2023-02-25
Payer: MEDICARE

## 2023-02-25 ENCOUNTER — HOSPITAL ENCOUNTER (EMERGENCY)
Age: 63
Discharge: HOME | End: 2023-02-25
Attending: EMERGENCY MEDICINE
Payer: MEDICARE

## 2023-02-25 VITALS
OXYGEN SATURATION: 94 % | HEIGHT: 67 IN | WEIGHT: 205 LBS | SYSTOLIC BLOOD PRESSURE: 134 MMHG | TEMPERATURE: 98.9 F | DIASTOLIC BLOOD PRESSURE: 59 MMHG | BODY MASS INDEX: 32.18 KG/M2 | RESPIRATION RATE: 17 BRPM | HEART RATE: 84 BPM

## 2023-02-25 DIAGNOSIS — N17.9 AKI (ACUTE KIDNEY INJURY) (HCC): ICD-10-CM

## 2023-02-25 DIAGNOSIS — R41.82 ALTERED MENTAL STATUS, UNSPECIFIED ALTERED MENTAL STATUS TYPE: Primary | ICD-10-CM

## 2023-02-25 LAB
ALBUMIN SERPL-MCNC: 3.5 G/DL (ref 3.5–4.6)
ALP BLD-CCNC: 55 U/L (ref 35–104)
ALT SERPL-CCNC: 6 U/L (ref 0–41)
AMMONIA: 22 UMOL/L (ref 16–60)
ANION GAP SERPL CALCULATED.3IONS-SCNC: 9 MEQ/L (ref 9–15)
APTT: 29.1 SEC (ref 24.4–36.8)
AST SERPL-CCNC: 9 U/L (ref 0–40)
BACTERIA: NEGATIVE /HPF
BASE EXCESS ARTERIAL: 12 (ref -3–3)
BASOPHILS ABSOLUTE: 0 K/UL (ref 0–0.2)
BASOPHILS RELATIVE PERCENT: 0.1 %
BILIRUB SERPL-MCNC: 0.3 MG/DL (ref 0.2–0.7)
BILIRUBIN URINE: NEGATIVE
BLOOD, URINE: ABNORMAL
BUN BLDV-MCNC: 36 MG/DL (ref 8–23)
CALCIUM IONIZED: 1.24 MMOL/L (ref 1.12–1.32)
CALCIUM SERPL-MCNC: 9.4 MG/DL (ref 8.5–9.9)
CHLORIDE BLD-SCNC: 99 MEQ/L (ref 95–107)
CLARITY: CLEAR
CO2: 35 MEQ/L (ref 20–31)
COLOR: YELLOW
CREAT SERPL-MCNC: 1.85 MG/DL (ref 0.7–1.2)
EKG ATRIAL RATE: 79 BPM
EKG Q-T INTERVAL: 376 MS
EKG QRS DURATION: 86 MS
EKG QTC CALCULATION (BAZETT): 431 MS
EKG R AXIS: 12 DEGREES
EKG T AXIS: 28 DEGREES
EKG VENTRICULAR RATE: 79 BPM
EOSINOPHILS ABSOLUTE: 0 K/UL (ref 0–0.7)
EOSINOPHILS RELATIVE PERCENT: 0.1 %
EPITHELIAL CELLS, UA: ABNORMAL /HPF (ref 0–5)
ETHANOL PERCENT: NORMAL G/DL
ETHANOL: <10 MG/DL (ref 0–0.08)
GFR SERPL CREATININE-BSD FRML MDRD: 37 ML/MIN/{1.73_M2}
GFR SERPL CREATININE-BSD FRML MDRD: 41 ML/MIN/{1.73_M2}
GLOBULIN: 3.2 G/DL (ref 2.3–3.5)
GLUCOSE BLD-MCNC: 230 MG/DL (ref 70–99)
GLUCOSE BLD-MCNC: 291 MG/DL (ref 70–99)
GLUCOSE URINE: NEGATIVE MG/DL
HCO3 ARTERIAL: 36 MMOL/L (ref 21–29)
HCT VFR BLD CALC: 36.9 % (ref 42–52)
HEMOGLOBIN: 12 G/DL (ref 14–18)
HEMOGLOBIN: 12.1 GM/DL (ref 13.5–17.5)
HYALINE CASTS: ABNORMAL /HPF (ref 0–5)
INR BLD: 1.1
KETONES, URINE: NEGATIVE MG/DL
LACTATE: 1.22 MMOL/L (ref 0.4–2)
LACTIC ACID: 1.8 MMOL/L (ref 0.5–2.2)
LEUKOCYTE ESTERASE, URINE: NEGATIVE
LIPASE: 15 U/L (ref 12–95)
LYMPHOCYTES ABSOLUTE: 1 K/UL (ref 1–4.8)
LYMPHOCYTES RELATIVE PERCENT: 15.7 %
MAGNESIUM: 1.7 MG/DL (ref 1.7–2.4)
MCH RBC QN AUTO: 33 PG (ref 27–31.3)
MCHC RBC AUTO-ENTMCNC: 32.5 % (ref 33–37)
MCV RBC AUTO: 101.5 FL (ref 79–92.2)
MONOCYTES ABSOLUTE: 0.4 K/UL (ref 0.2–0.8)
MONOCYTES RELATIVE PERCENT: 6.3 %
NEUTROPHILS ABSOLUTE: 4.8 K/UL (ref 1.4–6.5)
NEUTROPHILS RELATIVE PERCENT: 77.8 %
NITRITE, URINE: NEGATIVE
O2 SAT, ARTERIAL: 91 % (ref 93–100)
PCO2 ARTERIAL: 54 MM HG (ref 35–45)
PDW BLD-RTO: 14.5 % (ref 11.5–14.5)
PERFORMED ON: ABNORMAL
PH ARTERIAL: 7.44 (ref 7.35–7.45)
PH UA: 5.5 (ref 5–9)
PLATELET # BLD: 248 K/UL (ref 130–400)
PO2 ARTERIAL: 61 MM HG (ref 75–108)
POC CHLORIDE: 99 MEQ/L (ref 99–110)
POC CREATININE: 2 MG/DL (ref 0.8–1.3)
POC FIO2: 4
POC HEMATOCRIT: 36 % (ref 41–53)
POC POTASSIUM: 4.3 MEQ/L (ref 3.5–5.1)
POC SAMPLE TYPE: ABNORMAL
POC SODIUM: 143 MEQ/L (ref 136–145)
POTASSIUM SERPL-SCNC: 4.3 MEQ/L (ref 3.4–4.9)
PROTEIN UA: NEGATIVE MG/DL
PROTHROMBIN TIME: 13.9 SEC (ref 12.3–14.9)
RBC # BLD: 3.64 M/UL (ref 4.7–6.1)
RBC UA: ABNORMAL /HPF (ref 0–5)
SARS-COV-2, NAAT: NOT DETECTED
SODIUM BLD-SCNC: 143 MEQ/L (ref 135–144)
SPECIFIC GRAVITY UA: 1.01 (ref 1–1.03)
TCO2 ARTERIAL: 38 MMOL/L (ref 21–32)
TOTAL PROTEIN: 6.7 G/DL (ref 6.3–8)
TROPONIN: <0.01 NG/ML (ref 0–0.01)
UROBILINOGEN, URINE: 0.2 E.U./DL
WBC # BLD: 6.2 K/UL (ref 4.8–10.8)
WBC UA: ABNORMAL /HPF (ref 0–5)

## 2023-02-25 PROCEDURE — 84484 ASSAY OF TROPONIN QUANT: CPT

## 2023-02-25 PROCEDURE — 71045 X-RAY EXAM CHEST 1 VIEW: CPT

## 2023-02-25 PROCEDURE — 85014 HEMATOCRIT: CPT

## 2023-02-25 PROCEDURE — 93005 ELECTROCARDIOGRAM TRACING: CPT

## 2023-02-25 PROCEDURE — 82565 ASSAY OF CREATININE: CPT

## 2023-02-25 PROCEDURE — 96361 HYDRATE IV INFUSION ADD-ON: CPT

## 2023-02-25 PROCEDURE — 70450 CT HEAD/BRAIN W/O DYE: CPT

## 2023-02-25 PROCEDURE — 82140 ASSAY OF AMMONIA: CPT

## 2023-02-25 PROCEDURE — 82077 ASSAY SPEC XCP UR&BREATH IA: CPT

## 2023-02-25 PROCEDURE — 83605 ASSAY OF LACTIC ACID: CPT

## 2023-02-25 PROCEDURE — 84132 ASSAY OF SERUM POTASSIUM: CPT

## 2023-02-25 PROCEDURE — 84295 ASSAY OF SERUM SODIUM: CPT

## 2023-02-25 PROCEDURE — 94761 N-INVAS EAR/PLS OXIMETRY MLT: CPT

## 2023-02-25 PROCEDURE — 99285 EMERGENCY DEPT VISIT HI MDM: CPT

## 2023-02-25 PROCEDURE — 85730 THROMBOPLASTIN TIME PARTIAL: CPT

## 2023-02-25 PROCEDURE — 80053 COMPREHEN METABOLIC PANEL: CPT

## 2023-02-25 PROCEDURE — 83735 ASSAY OF MAGNESIUM: CPT

## 2023-02-25 PROCEDURE — 96360 HYDRATION IV INFUSION INIT: CPT

## 2023-02-25 PROCEDURE — 2700000000 HC OXYGEN THERAPY PER DAY

## 2023-02-25 PROCEDURE — 87635 SARS-COV-2 COVID-19 AMP PRB: CPT

## 2023-02-25 PROCEDURE — 87040 BLOOD CULTURE FOR BACTERIA: CPT

## 2023-02-25 PROCEDURE — 85025 COMPLETE CBC W/AUTO DIFF WBC: CPT

## 2023-02-25 PROCEDURE — 36415 COLL VENOUS BLD VENIPUNCTURE: CPT

## 2023-02-25 PROCEDURE — 83690 ASSAY OF LIPASE: CPT

## 2023-02-25 PROCEDURE — 82435 ASSAY OF BLOOD CHLORIDE: CPT

## 2023-02-25 PROCEDURE — 81001 URINALYSIS AUTO W/SCOPE: CPT

## 2023-02-25 PROCEDURE — 82803 BLOOD GASES ANY COMBINATION: CPT

## 2023-02-25 PROCEDURE — 2580000003 HC RX 258: Performed by: EMERGENCY MEDICINE

## 2023-02-25 PROCEDURE — 85610 PROTHROMBIN TIME: CPT

## 2023-02-25 PROCEDURE — 36600 WITHDRAWAL OF ARTERIAL BLOOD: CPT

## 2023-02-25 PROCEDURE — 82330 ASSAY OF CALCIUM: CPT

## 2023-02-25 RX ORDER — 0.9 % SODIUM CHLORIDE 0.9 %
1000 INTRAVENOUS SOLUTION INTRAVENOUS ONCE
Status: COMPLETED | OUTPATIENT
Start: 2023-02-25 | End: 2023-02-25

## 2023-02-25 RX ADMIN — SODIUM CHLORIDE 1000 ML: 9 INJECTION, SOLUTION INTRAVENOUS at 12:33

## 2023-02-25 RX ADMIN — SODIUM CHLORIDE 1000 ML: 9 INJECTION, SOLUTION INTRAVENOUS at 13:47

## 2023-02-25 ASSESSMENT — PAIN - FUNCTIONAL ASSESSMENT: PAIN_FUNCTIONAL_ASSESSMENT: NONE - DENIES PAIN

## 2023-02-25 NOTE — ED PROVIDER NOTES
[facility] ED  eMERGENCYdEPARTMENT eNCOUnter      Pt Name: Cameron Saravia  MRN: [id number]  [place] 1960  Date of evaluation: 2/25/2023  Brandie Michael MD    CHIEF COMPLAINT           HPI  Cameron Saravia is a 63 y.o. male per chart review has a h/o asthma, DM II, PE, HTN, Hpl, schizophrenia presents to the ED with AMS. Per NH, pt with gradual onset,moderate, constant, worsening AMS x 2 days. Pt is normally more alert. Pt's baseline is axox0. He can talk but does not make any sense. Pt denies is axox1 upon arrival.  Pt unsure of why he is here. Pt denies any pain. ROS  Review of Systems   Unable to perform ROS: Mental status change     Except as noted above the remainder of the review of systems was reviewed and negative. PAST MEDICAL HISTORY     Past Medical History:   Diagnosis Date    Anxiety     Asthma     DM (diabetes mellitus) ([facility]     History of pulmonary embolism     History of seizures     Hyperlipidemia     Hypertension     Long term (current) use of anticoagulants 5/12/2015    Schizophrenia ([facility]     Schizophrenia ([facility]     SOB (shortness of breath) 11/17/2016    Tobacco abuse          SURGICAL HISTORY       Past Surgical History:   Procedure Laterality Date    APPENDECTOMY      BACK SURGERY           CURRENTMEDICATIONS       Discharge Medication List as of 2/25/2023  2:26 PM        CONTINUE these medications which have NOT CHANGED    Details   !! albuterol sulfate HFA (PROAIR HFA) 108 (90 Base) MCG/ACT inhaler Use every 4 hours while awake for 7-10 days then PRN wheezing  Dispense with SPACER and Instruct on use.   May sub Ventolin or Proventil as needed per Insurance., Disp-1 Inhaler, R-1Print      ARIPiprazole (ABILIFY) 15 MG tablet Take 15 mg by mouth dailyHistorical Med      doxycycline hyclate (VIBRAMYCIN) 100 MG capsule Take 100 mg by mouth 2 times dailyHistorical Med      lisinopril (PRINIVIL;ZESTRIL) 2.5 MG tablet Take 1 tablet by mouth daily, Disp-30 tablet, R-3Print      diltiazem (CARDIZEM) 30 MG tablet Take 1 tablet by mouth every 12 hours, Disp-60 tablet, R-3Print      nicotine (NICODERM CQ) 21 MG/24HR Place 1 patch onto the skin daily, Disp-30 patch, R-3DC to SNF      metFORMIN (GLUCOPHAGE) 500 MG tablet Take 1 tablet by mouth 2 times daily (with meals), Disp-60 tablet, R-3DC to SNF      linagliptin (TRADJENTA) 5 MG tablet Take 1 tablet by mouth daily, Disp-30 tabletDC to SNF      ipratropium-albuterol (DUONEB) 0.5-2.5 (3) MG/3ML SOLN nebulizer solution Inhale 3 mLs into the lungs three times daily, Disp-360 mLDC to SNF      venlafaxine (EFFEXOR XR) 75 MG extended release capsule Take 1 capsule by mouth daily 2 TABS Q HS, Disp-30 capsule, R-6DC to SNF      paliperidone (INVEGA) 9 MG extended release tablet Take 1 tablet by mouth daily, Disp-30 tablet, R-3DC to SNF      !! albuterol sulfate HFA (PROAIR HFA) 108 (90 BASE) MCG/ACT inhaler 2 puffs by Other route      lovastatin (MEVACOR) 10 MG tablet Take 10 mg by mouth nightly Historical Med      magnesium oxide (MAGOX 400) 400 (241.3 MG) MG TABS tablet Take 400 mg by mouth 2 times daily Historical Med       !! - Potential duplicate medications found. Please discuss with provider. ALLERGIES     Acetaminophen and Pcn [penicillins]    FAMILY HISTORY     History reviewed. No pertinent family history. SOCIAL HISTORY       Social History     Socioeconomic History    Marital status: Single     Spouse name: None    Number of children: None    Years of education: None    Highest education level: None   Tobacco Use    Smoking status: Every Day     Packs/day: 1.00     Years: 14.00     Pack years: 14.00     Types: Cigarettes    Smokeless tobacco: Never   Substance and Sexual Activity    Alcohol use: No    Drug use: No         PHYSICAL EXAM       ED Triage Vitals   BP Temp Temp src Pulse Resp SpO2 Height Weight   -- -- -- -- -- -- -- --       Physical Exam  Vitals and nursing note reviewed. Constitutional:       Appearance: He is well-developed. HENT:      Head: Normocephalic. Right Ear: External ear normal.      Left Ear: External ear normal.      Mouth/Throat:      Comments: Mucous membranes dry. Eyes:      Conjunctiva/sclera: Conjunctivae normal.      Pupils: Pupils are equal, round, and reactive to light. Cardiovascular:      Rate and Rhythm: Normal rate and regular rhythm. Heart sounds: Normal heart sounds. Pulmonary:      Effort: Pulmonary effort is normal.      Breath sounds: Normal breath sounds. Abdominal:      General: Bowel sounds are normal. There is no distension. Palpations: Abdomen is soft. Tenderness: There is no abdominal tenderness. Musculoskeletal:         General: Normal range of motion. Cervical back: Normal range of motion and neck supple. Skin:     General: Skin is warm and dry. Neurological:      Mental Status: He is alert. Comments: Axox1. Diffusely normal strength, sensation. Psychiatric:         Mood and Affect: Mood normal.         MDM  64 yo male presents to the ED with AMS. Pt is afebrile, hemodynamically stable. Pt given 1 L NS in the ED. ABG unremarkable. EKG shows NSR with HR 79, normal axis, normal intervals, no ST changes. Labs remarkable for BUN 36, Cr 1.85. UA negative. CT head negative. CXR negative. Pt reassessed and is at his baseline mental status. Pt with LATRICIA in the ED. Pt's AMS likely caused by polypharmacy. Pt's psych meds which includes abilify are cleared renally. With LATRICIA, pt's psych meds likely having prolonged effect. Pt given 2nd L NS in the ED. Pt with reduced PO. Pt and NH educated about AMS and LATRICIA. Pt given dehydration warning signs and will f/u with pcp. FINAL IMPRESSION      1. Altered mental status, unspecified altered mental status type    2.  LATRICIA (acute kidney injury) [facility])          DISPOSITION/PLAN   DISPOSITION Decision To Discharge 02/25/2023 02:01:13 PM        DISCHARGE MEDICATIONS:  [unfilled]         Shoshana Berman MD(electronically signed)  Attending Emergency Physician           Shoshana Berman MD  02/26/23 1011

## 2023-02-26 LAB
BLOOD CULTURE, ROUTINE: NORMAL
BLOOD CULTURE, ROUTINE: NORMAL

## 2023-03-01 LAB
EKG ATRIAL RATE: 79 BPM
EKG Q-T INTERVAL: 376 MS
EKG QRS DURATION: 86 MS
EKG QTC CALCULATION (BAZETT): 431 MS
EKG R AXIS: 12 DEGREES
EKG T AXIS: 28 DEGREES
EKG VENTRICULAR RATE: 79 BPM

## 2023-03-02 LAB
BLOOD CULTURE, ROUTINE: NORMAL
BLOOD CULTURE, ROUTINE: NORMAL

## 2025-01-02 ENCOUNTER — HOSPITAL ENCOUNTER (INPATIENT)
Age: 65
LOS: 4 days | Discharge: NURSING, REHAB OR LONG-TERM CARE | DRG: 871 | End: 2025-01-06
Attending: INTERNAL MEDICINE | Admitting: INTERNAL MEDICINE
Payer: MEDICARE

## 2025-01-02 ENCOUNTER — APPOINTMENT (OUTPATIENT)
Dept: GENERAL RADIOLOGY | Age: 65
DRG: 871 | End: 2025-01-02
Payer: MEDICARE

## 2025-01-02 ENCOUNTER — APPOINTMENT (OUTPATIENT)
Dept: CT IMAGING | Age: 65
DRG: 871 | End: 2025-01-02
Payer: MEDICARE

## 2025-01-02 DIAGNOSIS — R73.9 HYPERGLYCEMIA: ICD-10-CM

## 2025-01-02 DIAGNOSIS — U07.1 COVID-19 VIRUS INFECTION: ICD-10-CM

## 2025-01-02 DIAGNOSIS — E87.1 HYPONATREMIA: ICD-10-CM

## 2025-01-02 DIAGNOSIS — J10.1 INFLUENZA A: Primary | ICD-10-CM
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
## 2025-01-02 PROBLEM — R53.1 GENERAL WEAKNESS: Status: ACTIVE | Noted: 2025-01-02

## 2025-01-02 LAB
ALBUMIN SERPL-MCNC: 3.8 G/DL (ref 3.5–4.6)
ALP SERPL-CCNC: 73 U/L (ref 35–104)
ALT SERPL-CCNC: 10 U/L (ref 0–41)
ANION GAP SERPL CALCULATED.3IONS-SCNC: 8 MEQ/L (ref 9–15)
APTT PPP: 28.9 SEC (ref 24.4–36.8)
AST SERPL-CCNC: 15 U/L (ref 0–40)
B PARAP IS1001 DNA NPH QL NAA+NON-PROBE: NOT DETECTED
B PERT.PT PRMT NPH QL NAA+NON-PROBE: NOT DETECTED
BASE EXCESS ARTERIAL: 11 (ref -3–3)
BASOPHILS # BLD: 0 K/UL (ref 0–0.2)
BASOPHILS NFR BLD: 0.2 %
BILIRUB SERPL-MCNC: 0.3 MG/DL (ref 0.2–0.7)
BUN SERPL-MCNC: 11 MG/DL (ref 8–23)
C PNEUM DNA NPH QL NAA+NON-PROBE: NOT DETECTED
CALCIUM IONIZED: 1.19 MMOL/L (ref 1.12–1.32)
CALCIUM SERPL-MCNC: 8.7 MG/DL (ref 8.5–9.9)
CHLORIDE SERPL-SCNC: 86 MEQ/L (ref 95–107)
CO2 SERPL-SCNC: 32 MEQ/L (ref 20–31)
CREAT SERPL-MCNC: 1.29 MG/DL (ref 0.7–1.2)
EOSINOPHIL # BLD: 0 K/UL (ref 0–0.7)
EOSINOPHIL NFR BLD: 0 %
ERYTHROCYTE [DISTWIDTH] IN BLOOD BY AUTOMATED COUNT: 13.3 % (ref 11.5–14.5)
FLUAV H3 RNA NPH QL NAA+NON-PROBE: DETECTED
FLUBV RNA NPH QL NAA+NON-PROBE: NOT DETECTED
GLOBULIN SER CALC-MCNC: 3 G/DL (ref 2.3–3.5)
GLUCOSE BLD-MCNC: 253 MG/DL (ref 70–99)
GLUCOSE SERPL-MCNC: 228 MG/DL (ref 70–99)
HADV DNA NPH QL NAA+NON-PROBE: NOT DETECTED
HCO3 ARTERIAL: 35.7 MMOL/L (ref 21–29)
HCOV 229E RNA NPH QL NAA+NON-PROBE: NOT DETECTED
HCOV HKU1 RNA NPH QL NAA+NON-PROBE: NOT DETECTED
HCOV NL63 RNA NPH QL NAA+NON-PROBE: NOT DETECTED
HCOV OC43 RNA NPH QL NAA+NON-PROBE: NOT DETECTED
HCT VFR BLD AUTO: 38.6 % (ref 42–52)
HCT VFR BLD AUTO: 39 % (ref 41–53)
HGB BLD CALC-MCNC: 13.3 GM/DL (ref 13.5–17.5)
HGB BLD-MCNC: 13.5 G/DL (ref 14–18)
HMPV RNA NPH QL NAA+NON-PROBE: NOT DETECTED
HPIV1 RNA NPH QL NAA+NON-PROBE: NOT DETECTED
HPIV2 RNA NPH QL NAA+NON-PROBE: NOT DETECTED
HPIV3 RNA NPH QL NAA+NON-PROBE: NOT DETECTED
HPIV4 RNA NPH QL NAA+NON-PROBE: NOT DETECTED
INR PPP: 1
LACTATE: 0.86 MMOL/L (ref 0.4–2)
LACTIC ACID, SEPSIS: 1.2 MMOL/L (ref 0.5–1.9)
LYMPHOCYTES # BLD: 1 K/UL (ref 1–4.8)
LYMPHOCYTES NFR BLD: 19.7 %
M PNEUMO DNA NPH QL NAA+NON-PROBE: NOT DETECTED
MAGNESIUM SERPL-MCNC: 1.8 MG/DL (ref 1.7–2.4)
MCH RBC QN AUTO: 33.3 PG (ref 27–31.3)
MCHC RBC AUTO-ENTMCNC: 35 % (ref 33–37)
MCV RBC AUTO: 95.3 FL (ref 79–92.2)
MONOCYTES # BLD: 0.8 K/UL (ref 0.2–0.8)
MONOCYTES NFR BLD: 14.2 %
NEUTROPHILS # BLD: 3.5 K/UL (ref 1.4–6.5)
NEUTS SEG NFR BLD: 65.3 %
O2 SAT, ARTERIAL: 90 % (ref 93–100)
PCO2 ARTERIAL: 62 MM HG (ref 35–45)
PERFORMED ON: ABNORMAL
PH ARTERIAL: 7.37 (ref 7.35–7.45)
PLATELET # BLD AUTO: 148 K/UL (ref 130–400)
PO2 ARTERIAL: 62 MM HG (ref 75–108)
POC CHLORIDE: 86 MEQ/L (ref 99–110)
POC CREATININE: 1.2 MG/DL (ref 0.8–1.3)
POC FIO2: 2.5
POC SAMPLE TYPE: ABNORMAL
POTASSIUM SERPL-SCNC: 4.4 MEQ/L (ref 3.5–5.1)
POTASSIUM SERPL-SCNC: 4.6 MEQ/L (ref 3.4–4.9)
PROCALCITONIN SERPL IA-MCNC: 0.08 NG/ML (ref 0–0.15)
PROT SERPL-MCNC: 6.8 G/DL (ref 6.3–8)
PROTHROMBIN TIME: 13.1 SEC (ref 12.3–14.9)
RBC # BLD AUTO: 4.05 M/UL (ref 4.7–6.1)
RSV RNA NPH QL NAA+NON-PROBE: NOT DETECTED
RV+EV RNA NPH QL NAA+NON-PROBE: NOT DETECTED
SARS-COV-2 RNA NPH QL NAA+NON-PROBE: DETECTED
SODIUM BLD-SCNC: 129 MEQ/L (ref 136–145)
SODIUM SERPL-SCNC: 126 MEQ/L (ref 135–144)
TCO2 ARTERIAL: 38 MMOL/L (ref 21–32)
TROPONIN, HIGH SENSITIVITY: 22 NG/L (ref 0–19)
TSH REFLEX: 1.7 UIU/ML (ref 0.44–3.86)
WBC # BLD AUTO: 5.3 K/UL (ref 4.8–10.8)

## 2025-01-02 PROCEDURE — 82803 BLOOD GASES ANY COMBINATION: CPT

## 2025-01-02 PROCEDURE — 84443 ASSAY THYROID STIM HORMONE: CPT

## 2025-01-02 PROCEDURE — 73503 X-RAY EXAM HIP UNI 4/> VIEWS: CPT

## 2025-01-02 PROCEDURE — 99285 EMERGENCY DEPT VISIT HI MDM: CPT

## 2025-01-02 PROCEDURE — 1210000000 HC MED SURG R&B

## 2025-01-02 PROCEDURE — 71045 X-RAY EXAM CHEST 1 VIEW: CPT

## 2025-01-02 PROCEDURE — 6370000000 HC RX 637 (ALT 250 FOR IP)

## 2025-01-02 PROCEDURE — 84295 ASSAY OF SERUM SODIUM: CPT

## 2025-01-02 PROCEDURE — 70450 CT HEAD/BRAIN W/O DYE: CPT

## 2025-01-02 PROCEDURE — 96361 HYDRATE IV INFUSION ADD-ON: CPT

## 2025-01-02 PROCEDURE — 85730 THROMBOPLASTIN TIME PARTIAL: CPT

## 2025-01-02 PROCEDURE — 87150 DNA/RNA AMPLIFIED PROBE: CPT

## 2025-01-02 PROCEDURE — 93005 ELECTROCARDIOGRAM TRACING: CPT

## 2025-01-02 PROCEDURE — 82330 ASSAY OF CALCIUM: CPT

## 2025-01-02 PROCEDURE — 87040 BLOOD CULTURE FOR BACTERIA: CPT

## 2025-01-02 PROCEDURE — 0202U NFCT DS 22 TRGT SARS-COV-2: CPT

## 2025-01-02 PROCEDURE — 83735 ASSAY OF MAGNESIUM: CPT

## 2025-01-02 PROCEDURE — 84145 PROCALCITONIN (PCT): CPT

## 2025-01-02 PROCEDURE — 85025 COMPLETE CBC W/AUTO DIFF WBC: CPT

## 2025-01-02 PROCEDURE — 6360000002 HC RX W HCPCS

## 2025-01-02 PROCEDURE — 82435 ASSAY OF BLOOD CHLORIDE: CPT

## 2025-01-02 PROCEDURE — 85014 HEMATOCRIT: CPT

## 2025-01-02 PROCEDURE — 36600 WITHDRAWAL OF ARTERIAL BLOOD: CPT

## 2025-01-02 PROCEDURE — 82565 ASSAY OF CREATININE: CPT

## 2025-01-02 PROCEDURE — 85610 PROTHROMBIN TIME: CPT

## 2025-01-02 PROCEDURE — 80053 COMPREHEN METABOLIC PANEL: CPT

## 2025-01-02 PROCEDURE — 2580000003 HC RX 258

## 2025-01-02 PROCEDURE — 84132 ASSAY OF SERUM POTASSIUM: CPT

## 2025-01-02 PROCEDURE — 84484 ASSAY OF TROPONIN QUANT: CPT

## 2025-01-02 PROCEDURE — 83605 ASSAY OF LACTIC ACID: CPT

## 2025-01-02 PROCEDURE — 36415 COLL VENOUS BLD VENIPUNCTURE: CPT

## 2025-01-02 RX ORDER — FENTANYL CITRATE 0.05 MG/ML
50 INJECTION, SOLUTION INTRAMUSCULAR; INTRAVENOUS ONCE
Status: DISCONTINUED | OUTPATIENT
Start: 2025-01-02 | End: 2025-01-02

## 2025-01-02 RX ORDER — FENTANYL CITRATE 0.05 MG/ML
25 INJECTION, SOLUTION INTRAMUSCULAR; INTRAVENOUS ONCE
Status: COMPLETED | OUTPATIENT
Start: 2025-01-02 | End: 2025-01-02

## 2025-01-02 RX ORDER — 0.9 % SODIUM CHLORIDE 0.9 %
1000 INTRAVENOUS SOLUTION INTRAVENOUS ONCE
Status: COMPLETED | OUTPATIENT
Start: 2025-01-02 | End: 2025-01-02

## 2025-01-02 RX ORDER — OSELTAMIVIR PHOSPHATE 75 MG/1
75 CAPSULE ORAL ONCE
Status: COMPLETED | OUTPATIENT
Start: 2025-01-02 | End: 2025-01-02

## 2025-01-02 RX ADMIN — SODIUM CHLORIDE 1000 ML: 9 INJECTION, SOLUTION INTRAVENOUS at 18:59

## 2025-01-02 RX ADMIN — OSELTAMIVIR PHOSPHATE 75 MG: 75 CAPSULE ORAL at 21:02

## 2025-01-02 RX ADMIN — FENTANYL CITRATE 25 MCG: 0.05 INJECTION, SOLUTION INTRAMUSCULAR; INTRAVENOUS at 21:45

## 2025-01-02 ASSESSMENT — LIFESTYLE VARIABLES
HOW MANY STANDARD DRINKS CONTAINING ALCOHOL DO YOU HAVE ON A TYPICAL DAY: PATIENT DOES NOT DRINK
HOW OFTEN DO YOU HAVE A DRINK CONTAINING ALCOHOL: NEVER

## 2025-01-02 ASSESSMENT — PAIN DESCRIPTION - LOCATION: LOCATION: HEAD

## 2025-01-02 ASSESSMENT — PAIN - FUNCTIONAL ASSESSMENT: PAIN_FUNCTIONAL_ASSESSMENT: NONE - DENIES PAIN

## 2025-01-02 ASSESSMENT — PAIN DESCRIPTION - DESCRIPTORS: DESCRIPTORS: ACHING

## 2025-01-02 ASSESSMENT — PAIN SCALES - GENERAL: PAINLEVEL_OUTOF10: 8

## 2025-01-02 NOTE — ED PROVIDER NOTES
regarding hospitalization.      64-year-old male patient presents to the ED for evaluation of general weakness, upper respiratory symptoms, fall and altered mental status.  Presents from [facility].  Patient reporting general cough congestion and feeling weak for the past several days.  Reportedly felt acutely weak today and fell down to the ground.  Nursing home staff stating he has been more altered since fall.  Patient denies head injury, LOC, states he is having mild right hip pain from the fall.  Patient presents febrile with temperature 100.6.  Heart rate 100.  Respirations 20.  /89.  91% on his baseline 3 L oxygen.  Patient appears fatigued but not lethargic or obtunded, he is alert and oriented and able to answer all questions appropriately.  Crackles noted on auscultation.  Workup demonstrates evidence of COVID 19 and influenza A infections.  Patient was started on fluid promptly.  Chest x-ray shows possible developing infiltrate right lower lobe with associated right-sided pleural effusion.  Patient has no leukocytosis.  White blood cell count is 5.3.  Lactic acid is 1.2.  Procalcitonin is low at 0.08.  Likely viral nature.  TSH within normal limits at 1.7.  Initial troponin mildly elevated at 22.  Repeat pending.  EKG without acute ischemia changes.  Patient hyponatremic with sodium 126.  Glucose is 228.  Anion gap 8.  Bicarb 32.  Chloride 86.  Creatinine 1.29.  BUN 11.  X-ray right hip with pelvis does not show acute fracture process.  CT head does not show acute intracranial or traumatic abnormality.    REASSESSMENT   Patient intermittently tachypneic in the ED.  Remains extremely weak and unable to stand up at bedside independently.  Possibly developing viral PNA right lower lobe.  Positive COVID-19 and influenza infections.  Patient will require admission.  Admitted to Dr. Ruiz.       CRITICAL CARE TIME   Total Critical Care time was 0 minutes, excluding separately reportable

## 2025-01-02 NOTE — ED NOTES
Pt came from [place] via EMS for altered mental status.   Pt allegedly had a fall around 1430 today and he states he \"fell onto his legs\"  Upon assessment, pt is found to be febrile at 100.6  Unable to answer orientation questions  Active, moist cough and rales heard throughout all bases  Hx of COPD on 3L   Skin is pale and warm to touch  Face has erythema and rash   HR elevated at 99  /89  KARL Wheat assessing at bedside

## 2025-01-03 PROBLEM — E87.1 HYPONATREMIA: Status: ACTIVE | Noted: 2025-01-03

## 2025-01-03 PROBLEM — J10.1 INFLUENZA A: Status: ACTIVE | Noted: 2025-01-03
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
## 2025-01-03 LAB
ALBUMIN SERPL-MCNC: 3.5 G/DL (ref 3.5–4.6)
ALBUMIN SERPL-MCNC: 3.5 G/DL (ref 3.5–4.6)
ALBUMIN SERPL-MCNC: 3.6 G/DL (ref 3.5–4.6)
ALP SERPL-CCNC: 67 U/L (ref 35–104)
ALP SERPL-CCNC: 70 U/L (ref 35–104)
ALP SERPL-CCNC: 73 U/L (ref 35–104)
ALT SERPL-CCNC: 10 U/L (ref 0–41)
ALT SERPL-CCNC: 12 U/L (ref 0–41)
ALT SERPL-CCNC: 9 U/L (ref 0–41)
AMMONIA PLAS-SCNC: 20 UMOL/L (ref 16–60)
ANION GAP SERPL CALCULATED.3IONS-SCNC: 7 MEQ/L (ref 9–15)
ANION GAP SERPL CALCULATED.3IONS-SCNC: 7 MEQ/L (ref 9–15)
ANION GAP SERPL CALCULATED.3IONS-SCNC: 8 MEQ/L (ref 9–15)
AST SERPL-CCNC: 12 U/L (ref 0–40)
AST SERPL-CCNC: 16 U/L (ref 0–40)
AST SERPL-CCNC: 21 U/L (ref 0–40)
BASOPHILS # BLD: 0 K/UL (ref 0–0.2)
BASOPHILS NFR BLD: 0.2 %
BILIRUB SERPL-MCNC: 0.3 MG/DL (ref 0.2–0.7)
BUN SERPL-MCNC: 11 MG/DL (ref 8–23)
BUN SERPL-MCNC: 15 MG/DL (ref 8–23)
BUN SERPL-MCNC: 9 MG/DL (ref 8–23)
CALCIUM SERPL-MCNC: 8.4 MG/DL (ref 8.5–9.9)
CALCIUM SERPL-MCNC: 8.5 MG/DL (ref 8.5–9.9)
CALCIUM SERPL-MCNC: 8.6 MG/DL (ref 8.5–9.9)
CHLORIDE SERPL-SCNC: 87 MEQ/L (ref 95–107)
CHLORIDE SERPL-SCNC: 89 MEQ/L (ref 95–107)
CHLORIDE SERPL-SCNC: 90 MEQ/L (ref 95–107)
CO2 SERPL-SCNC: 30 MEQ/L (ref 20–31)
CO2 SERPL-SCNC: 30 MEQ/L (ref 20–31)
CO2 SERPL-SCNC: 31 MEQ/L (ref 20–31)
CREAT SERPL-MCNC: 1.12 MG/DL (ref 0.7–1.2)
CREAT SERPL-MCNC: 1.18 MG/DL (ref 0.7–1.2)
CREAT SERPL-MCNC: 1.25 MG/DL (ref 0.7–1.2)
EKG ATRIAL RATE: 98 BPM
EKG P AXIS: 34 DEGREES
EKG P-R INTERVAL: 116 MS
EKG Q-T INTERVAL: 338 MS
EKG QRS DURATION: 92 MS
EKG QTC CALCULATION (BAZETT): 431 MS
EKG R AXIS: 8 DEGREES
EKG T AXIS: 20 DEGREES
EKG VENTRICULAR RATE: 98 BPM
EOSINOPHIL # BLD: 0 K/UL (ref 0–0.7)
EOSINOPHIL NFR BLD: 0 %
ERYTHROCYTE [DISTWIDTH] IN BLOOD BY AUTOMATED COUNT: 13.3 % (ref 11.5–14.5)
FOLATE: 13.8 NG/ML (ref 4.8–24.2)
GLOBULIN SER CALC-MCNC: 2.7 G/DL (ref 2.3–3.5)
GLOBULIN SER CALC-MCNC: 3 G/DL (ref 2.3–3.5)
GLOBULIN SER CALC-MCNC: 3 G/DL (ref 2.3–3.5)
GLUCOSE BLD-MCNC: 186 MG/DL (ref 70–99)
GLUCOSE BLD-MCNC: 216 MG/DL (ref 70–99)
GLUCOSE BLD-MCNC: 292 MG/DL (ref 70–99)
GLUCOSE BLD-MCNC: 300 MG/DL (ref 70–99)
GLUCOSE BLD-MCNC: 347 MG/DL (ref 70–99)
GLUCOSE SERPL-MCNC: 169 MG/DL (ref 70–99)
GLUCOSE SERPL-MCNC: 304 MG/DL (ref 70–99)
GLUCOSE SERPL-MCNC: 317 MG/DL (ref 70–99)
HCT VFR BLD AUTO: 36.7 % (ref 42–52)
HGB BLD-MCNC: 12.9 G/DL (ref 14–18)
LACTIC ACID, SEPSIS: 0.9 MMOL/L (ref 0.5–1.9)
LYMPHOCYTES # BLD: 1 K/UL (ref 1–4.8)
LYMPHOCYTES NFR BLD: 23.1 %
MAGNESIUM SERPL-MCNC: 1.8 MG/DL (ref 1.7–2.4)
MCH RBC QN AUTO: 32.9 PG (ref 27–31.3)
MCHC RBC AUTO-ENTMCNC: 35.1 % (ref 33–37)
MCV RBC AUTO: 93.6 FL (ref 79–92.2)
MONOCYTES # BLD: 0.8 K/UL (ref 0.2–0.8)
MONOCYTES NFR BLD: 17 %
NEUTROPHILS # BLD: 2.6 K/UL (ref 1.4–6.5)
NEUTS SEG NFR BLD: 59.2 %
PERFORMED ON: ABNORMAL
PLATELET # BLD AUTO: 157 K/UL (ref 130–400)
POTASSIUM SERPL-SCNC: 4 MEQ/L (ref 3.4–4.9)
POTASSIUM SERPL-SCNC: 4.9 MEQ/L (ref 3.4–4.9)
POTASSIUM SERPL-SCNC: 5.6 MEQ/L (ref 3.4–4.9)
PROCALCITONIN SERPL IA-MCNC: 0.08 NG/ML (ref 0–0.15)
PROT SERPL-MCNC: 6.2 G/DL (ref 6.3–8)
PROT SERPL-MCNC: 6.5 G/DL (ref 6.3–8)
PROT SERPL-MCNC: 6.6 G/DL (ref 6.3–8)
RBC # BLD AUTO: 3.92 M/UL (ref 4.7–6.1)
SODIUM SERPL-SCNC: 125 MEQ/L (ref 135–144)
SODIUM SERPL-SCNC: 127 MEQ/L (ref 135–144)
SODIUM SERPL-SCNC: 127 MEQ/L (ref 135–144)
TROPONIN, HIGH SENSITIVITY: 26 NG/L (ref 0–19)
VALPROATE SERPL-MCNC: 31.2 UG/ML (ref 50–100)
VITAMIN B-12: 323 PG/ML (ref 232–1245)
WBC # BLD AUTO: 4.4 K/UL (ref 4.8–10.8)

## 2025-01-03 PROCEDURE — 83735 ASSAY OF MAGNESIUM: CPT

## 2025-01-03 PROCEDURE — 1210000000 HC MED SURG R&B

## 2025-01-03 PROCEDURE — 85025 COMPLETE CBC W/AUTO DIFF WBC: CPT

## 2025-01-03 PROCEDURE — 97162 PT EVAL MOD COMPLEX 30 MIN: CPT

## 2025-01-03 PROCEDURE — 83605 ASSAY OF LACTIC ACID: CPT

## 2025-01-03 PROCEDURE — 87070 CULTURE OTHR SPECIMN AEROBIC: CPT

## 2025-01-03 PROCEDURE — 82140 ASSAY OF AMMONIA: CPT

## 2025-01-03 PROCEDURE — 80164 ASSAY DIPROPYLACETIC ACD TOT: CPT

## 2025-01-03 PROCEDURE — 6370000000 HC RX 637 (ALT 250 FOR IP): Performed by: INTERNAL MEDICINE

## 2025-01-03 PROCEDURE — 6370000000 HC RX 637 (ALT 250 FOR IP)

## 2025-01-03 PROCEDURE — 2700000000 HC OXYGEN THERAPY PER DAY

## 2025-01-03 PROCEDURE — 6360000002 HC RX W HCPCS

## 2025-01-03 PROCEDURE — 36415 COLL VENOUS BLD VENIPUNCTURE: CPT

## 2025-01-03 PROCEDURE — APPSS45 APP SPLIT SHARED TIME 31-45 MINUTES: Performed by: NURSE PRACTITIONER

## 2025-01-03 PROCEDURE — 84484 ASSAY OF TROPONIN QUANT: CPT

## 2025-01-03 PROCEDURE — 2580000003 HC RX 258

## 2025-01-03 PROCEDURE — 2500000003 HC RX 250 WO HCPCS

## 2025-01-03 PROCEDURE — 80053 COMPREHEN METABOLIC PANEL: CPT

## 2025-01-03 PROCEDURE — 94640 AIRWAY INHALATION TREATMENT: CPT

## 2025-01-03 PROCEDURE — 82746 ASSAY OF FOLIC ACID SERUM: CPT

## 2025-01-03 PROCEDURE — 82607 VITAMIN B-12: CPT

## 2025-01-03 PROCEDURE — 94761 N-INVAS EAR/PLS OXIMETRY MLT: CPT

## 2025-01-03 PROCEDURE — 84145 PROCALCITONIN (PCT): CPT

## 2025-01-03 PROCEDURE — 99222 1ST HOSP IP/OBS MODERATE 55: CPT | Performed by: PSYCHIATRY & NEUROLOGY

## 2025-01-03 PROCEDURE — 97166 OT EVAL MOD COMPLEX 45 MIN: CPT

## 2025-01-03 RX ORDER — POLYETHYLENE GLYCOL 3350 17 G/17G
17 POWDER, FOR SOLUTION ORAL DAILY PRN
Status: DISCONTINUED | OUTPATIENT
Start: 2025-01-03 | End: 2025-01-06 | Stop reason: HOSPADM

## 2025-01-03 RX ORDER — ONDANSETRON 4 MG/1
4 TABLET, ORALLY DISINTEGRATING ORAL EVERY 8 HOURS PRN
Status: DISCONTINUED | OUTPATIENT
Start: 2025-01-03 | End: 2025-01-06 | Stop reason: HOSPADM

## 2025-01-03 RX ORDER — BUSPIRONE HYDROCHLORIDE 7.5 MG/1
7.5 TABLET ORAL 3 TIMES DAILY
Status: DISCONTINUED | OUTPATIENT
Start: 2025-01-03 | End: 2025-01-06 | Stop reason: HOSPADM

## 2025-01-03 RX ORDER — ATORVASTATIN CALCIUM 20 MG/1
20 TABLET, FILM COATED ORAL DAILY
Status: DISCONTINUED | OUTPATIENT
Start: 2025-01-03 | End: 2025-01-06 | Stop reason: HOSPADM

## 2025-01-03 RX ORDER — SODIUM CHLORIDE 0.9 % (FLUSH) 0.9 %
5-40 SYRINGE (ML) INJECTION PRN
Status: DISCONTINUED | OUTPATIENT
Start: 2025-01-03 | End: 2025-01-06 | Stop reason: HOSPADM

## 2025-01-03 RX ORDER — OLANZAPINE 10 MG/1
10 TABLET ORAL NIGHTLY
COMMUNITY
Start: 2024-12-10

## 2025-01-03 RX ORDER — ALBUTEROL SULFATE 90 UG/1
2 INHALANT RESPIRATORY (INHALATION)
Status: DISCONTINUED | OUTPATIENT
Start: 2025-01-03 | End: 2025-01-06

## 2025-01-03 RX ORDER — LACTULOSE 10 G/15ML
10 SOLUTION ORAL; RECTAL 2 TIMES DAILY
COMMUNITY
Start: 2024-12-10

## 2025-01-03 RX ORDER — ENOXAPARIN SODIUM 100 MG/ML
30 INJECTION SUBCUTANEOUS 2 TIMES DAILY
Status: DISCONTINUED | OUTPATIENT
Start: 2025-01-03 | End: 2025-01-06 | Stop reason: HOSPADM

## 2025-01-03 RX ORDER — DIVALPROEX SODIUM 125 MG/1
500 CAPSULE, COATED PELLETS ORAL 2 TIMES DAILY
Status: DISCONTINUED | OUTPATIENT
Start: 2025-01-03 | End: 2025-01-06 | Stop reason: HOSPADM

## 2025-01-03 RX ORDER — ISOSORBIDE MONONITRATE 30 MG/1
30 TABLET, EXTENDED RELEASE ORAL DAILY
COMMUNITY
Start: 2024-12-16

## 2025-01-03 RX ORDER — SODIUM CHLORIDE 9 MG/ML
INJECTION, SOLUTION INTRAVENOUS CONTINUOUS
Status: DISCONTINUED | OUTPATIENT
Start: 2025-01-03 | End: 2025-01-04

## 2025-01-03 RX ORDER — ISOSORBIDE MONONITRATE 60 MG/1
30 TABLET, EXTENDED RELEASE ORAL DAILY
Status: DISCONTINUED | OUTPATIENT
Start: 2025-01-03 | End: 2025-01-06 | Stop reason: HOSPADM

## 2025-01-03 RX ORDER — POTASSIUM CHLORIDE 1500 MG/1
20 TABLET, EXTENDED RELEASE ORAL DAILY
COMMUNITY
Start: 2024-12-30

## 2025-01-03 RX ORDER — OSELTAMIVIR PHOSPHATE 75 MG/1
75 CAPSULE ORAL 2 TIMES DAILY
Status: DISCONTINUED | OUTPATIENT
Start: 2025-01-03 | End: 2025-01-06 | Stop reason: HOSPADM

## 2025-01-03 RX ORDER — ALLOPURINOL 100 MG/1
TABLET ORAL
COMMUNITY
Start: 2024-12-14

## 2025-01-03 RX ORDER — IPRATROPIUM BROMIDE AND ALBUTEROL SULFATE 2.5; .5 MG/3ML; MG/3ML
1 SOLUTION RESPIRATORY (INHALATION)
Status: DISCONTINUED | OUTPATIENT
Start: 2025-01-03 | End: 2025-01-06 | Stop reason: HOSPADM

## 2025-01-03 RX ORDER — NICOTINE 21 MG/24HR
1 PATCH, TRANSDERMAL 24 HOURS TRANSDERMAL DAILY
Status: DISCONTINUED | OUTPATIENT
Start: 2025-01-03 | End: 2025-01-06 | Stop reason: HOSPADM

## 2025-01-03 RX ORDER — FUROSEMIDE 20 MG/1
20 TABLET ORAL DAILY
COMMUNITY
Start: 2024-12-17

## 2025-01-03 RX ORDER — FLUTICASONE FUROATE, UMECLIDINIUM BROMIDE AND VILANTEROL TRIFENATATE 100; 62.5; 25 UG/1; UG/1; UG/1
1 POWDER RESPIRATORY (INHALATION) DAILY
COMMUNITY
Start: 2025-01-01

## 2025-01-03 RX ORDER — POTASSIUM CHLORIDE 1500 MG/1
40 TABLET, EXTENDED RELEASE ORAL PRN
Status: DISCONTINUED | OUTPATIENT
Start: 2025-01-03 | End: 2025-01-06 | Stop reason: HOSPADM

## 2025-01-03 RX ORDER — BENZONATATE 100 MG/1
100 CAPSULE ORAL 3 TIMES DAILY PRN
Status: DISCONTINUED | OUTPATIENT
Start: 2025-01-03 | End: 2025-01-06 | Stop reason: HOSPADM

## 2025-01-03 RX ORDER — LANOLIN ALCOHOL/MO/W.PET/CERES
400 CREAM (GRAM) TOPICAL 2 TIMES DAILY
Status: DISCONTINUED | OUTPATIENT
Start: 2025-01-03 | End: 2025-01-06 | Stop reason: HOSPADM

## 2025-01-03 RX ORDER — LEVOTHYROXINE SODIUM 75 UG/1
75 TABLET ORAL DAILY
COMMUNITY
Start: 2024-12-29

## 2025-01-03 RX ORDER — OLANZAPINE 5 MG/1
10 TABLET ORAL NIGHTLY
Status: DISCONTINUED | OUTPATIENT
Start: 2025-01-03 | End: 2025-01-06 | Stop reason: HOSPADM

## 2025-01-03 RX ORDER — GLUCAGON 1 MG/ML
1 KIT INJECTION PRN
Status: DISCONTINUED | OUTPATIENT
Start: 2025-01-03 | End: 2025-01-06 | Stop reason: HOSPADM

## 2025-01-03 RX ORDER — ATENOLOL 50 MG/1
50 TABLET ORAL DAILY
Status: DISCONTINUED | OUTPATIENT
Start: 2025-01-03 | End: 2025-01-06 | Stop reason: HOSPADM

## 2025-01-03 RX ORDER — SIMVASTATIN 40 MG
40 TABLET ORAL NIGHTLY
COMMUNITY
Start: 2024-12-11

## 2025-01-03 RX ORDER — ONDANSETRON 2 MG/ML
4 INJECTION INTRAMUSCULAR; INTRAVENOUS EVERY 6 HOURS PRN
Status: DISCONTINUED | OUTPATIENT
Start: 2025-01-03 | End: 2025-01-06 | Stop reason: HOSPADM

## 2025-01-03 RX ORDER — BUPROPION HYDROCHLORIDE 150 MG/1
TABLET, EXTENDED RELEASE ORAL
COMMUNITY
Start: 2024-10-08

## 2025-01-03 RX ORDER — ASPIRIN 81 MG/1
81 TABLET, CHEWABLE ORAL DAILY
Status: DISCONTINUED | OUTPATIENT
Start: 2025-01-03 | End: 2025-01-06 | Stop reason: HOSPADM

## 2025-01-03 RX ORDER — ALBUTEROL SULFATE 90 UG/1
2 INHALANT RESPIRATORY (INHALATION)
Status: DISCONTINUED | OUTPATIENT
Start: 2025-01-03 | End: 2025-01-03

## 2025-01-03 RX ORDER — BUPROPION HYDROCHLORIDE 150 MG/1
150 TABLET, EXTENDED RELEASE ORAL DAILY
Status: DISCONTINUED | OUTPATIENT
Start: 2025-01-03 | End: 2025-01-06 | Stop reason: HOSPADM

## 2025-01-03 RX ORDER — POTASSIUM CHLORIDE 1500 MG/1
20 TABLET, EXTENDED RELEASE ORAL DAILY
Status: DISCONTINUED | OUTPATIENT
Start: 2025-01-03 | End: 2025-01-06 | Stop reason: HOSPADM

## 2025-01-03 RX ORDER — GABAPENTIN 300 MG/1
300 CAPSULE ORAL 3 TIMES DAILY
Status: DISCONTINUED | OUTPATIENT
Start: 2025-01-03 | End: 2025-01-06 | Stop reason: HOSPADM

## 2025-01-03 RX ORDER — SODIUM CHLORIDE 0.9 % (FLUSH) 0.9 %
5-40 SYRINGE (ML) INJECTION EVERY 12 HOURS SCHEDULED
Status: DISCONTINUED | OUTPATIENT
Start: 2025-01-03 | End: 2025-01-06 | Stop reason: HOSPADM

## 2025-01-03 RX ORDER — DEXTROSE MONOHYDRATE 100 MG/ML
INJECTION, SOLUTION INTRAVENOUS CONTINUOUS PRN
Status: DISCONTINUED | OUTPATIENT
Start: 2025-01-03 | End: 2025-01-06 | Stop reason: HOSPADM

## 2025-01-03 RX ORDER — SODIUM CHLORIDE 9 MG/ML
INJECTION, SOLUTION INTRAVENOUS PRN
Status: DISCONTINUED | OUTPATIENT
Start: 2025-01-03 | End: 2025-01-06 | Stop reason: HOSPADM

## 2025-01-03 RX ORDER — BUDESONIDE AND FORMOTEROL FUMARATE DIHYDRATE 80; 4.5 UG/1; UG/1
2 AEROSOL RESPIRATORY (INHALATION)
Status: DISCONTINUED | OUTPATIENT
Start: 2025-01-03 | End: 2025-01-06 | Stop reason: HOSPADM

## 2025-01-03 RX ORDER — LISINOPRIL 2.5 MG/1
2.5 TABLET ORAL DAILY
Status: DISCONTINUED | OUTPATIENT
Start: 2025-01-03 | End: 2025-01-06 | Stop reason: HOSPADM

## 2025-01-03 RX ORDER — MAGNESIUM SULFATE IN WATER 40 MG/ML
2000 INJECTION, SOLUTION INTRAVENOUS PRN
Status: DISCONTINUED | OUTPATIENT
Start: 2025-01-03 | End: 2025-01-06 | Stop reason: HOSPADM

## 2025-01-03 RX ORDER — POTASSIUM CHLORIDE 7.45 MG/ML
10 INJECTION INTRAVENOUS PRN
Status: DISCONTINUED | OUTPATIENT
Start: 2025-01-03 | End: 2025-01-06 | Stop reason: HOSPADM

## 2025-01-03 RX ORDER — INSULIN ASPART INJECTION 100 [IU]/ML
INJECTION, SOLUTION SUBCUTANEOUS
COMMUNITY
Start: 2024-12-28

## 2025-01-03 RX ORDER — GABAPENTIN 300 MG/1
300 CAPSULE ORAL 3 TIMES DAILY
COMMUNITY
Start: 2024-12-14

## 2025-01-03 RX ORDER — BUSPIRONE HYDROCHLORIDE 7.5 MG/1
7.5 TABLET ORAL 3 TIMES DAILY
COMMUNITY
Start: 2024-12-09

## 2025-01-03 RX ORDER — LEVOTHYROXINE SODIUM 75 UG/1
75 TABLET ORAL DAILY
Status: DISCONTINUED | OUTPATIENT
Start: 2025-01-03 | End: 2025-01-06 | Stop reason: HOSPADM

## 2025-01-03 RX ORDER — INSULIN GLARGINE 100 [IU]/ML
27 INJECTION, SOLUTION SUBCUTANEOUS NIGHTLY
Status: DISCONTINUED | OUTPATIENT
Start: 2025-01-03 | End: 2025-01-06 | Stop reason: HOSPADM

## 2025-01-03 RX ORDER — MOMETASONE FUROATE 1 MG/G
CREAM TOPICAL
COMMUNITY
Start: 2024-11-26

## 2025-01-03 RX ORDER — ATENOLOL 50 MG/1
TABLET ORAL
COMMUNITY
Start: 2024-12-16

## 2025-01-03 RX ORDER — ASPIRIN 81 MG/1
81 TABLET, CHEWABLE ORAL DAILY
COMMUNITY

## 2025-01-03 RX ORDER — INSULIN LISPRO 100 [IU]/ML
0-8 INJECTION, SOLUTION INTRAVENOUS; SUBCUTANEOUS
Status: DISCONTINUED | OUTPATIENT
Start: 2025-01-03 | End: 2025-01-06 | Stop reason: HOSPADM

## 2025-01-03 RX ORDER — INSULIN GLARGINE 100 [IU]/ML
27 INJECTION, SOLUTION SUBCUTANEOUS NIGHTLY
COMMUNITY
Start: 2024-11-09

## 2025-01-03 RX ORDER — IBUPROFEN 400 MG/1
400 TABLET, FILM COATED ORAL EVERY 6 HOURS PRN
COMMUNITY
Start: 2024-12-23

## 2025-01-03 RX ORDER — DIVALPROEX SODIUM 125 MG/1
500 CAPSULE, COATED PELLETS ORAL 2 TIMES DAILY
COMMUNITY
Start: 2024-12-16

## 2025-01-03 RX ORDER — GUAIFENESIN 600 MG/1
600 TABLET, EXTENDED RELEASE ORAL 2 TIMES DAILY
Status: DISCONTINUED | OUTPATIENT
Start: 2025-01-03 | End: 2025-01-06 | Stop reason: HOSPADM

## 2025-01-03 RX ADMIN — BUDESONIDE AND FORMOTEROL FUMARATE DIHYDRATE 2 PUFF: 80; 4.5 AEROSOL RESPIRATORY (INHALATION) at 06:58

## 2025-01-03 RX ADMIN — LISINOPRIL 2.5 MG: 2.5 TABLET ORAL at 10:58

## 2025-01-03 RX ADMIN — SODIUM CHLORIDE, PRESERVATIVE FREE 10 ML: 5 INJECTION INTRAVENOUS at 11:20

## 2025-01-03 RX ADMIN — Medication 400 MG: at 10:59

## 2025-01-03 RX ADMIN — BUDESONIDE AND FORMOTEROL FUMARATE DIHYDRATE 2 PUFF: 80; 4.5 AEROSOL RESPIRATORY (INHALATION) at 18:47

## 2025-01-03 RX ADMIN — Medication 400 MG: at 20:59

## 2025-01-03 RX ADMIN — ALBUTEROL SULFATE 2 PUFF: 90 AEROSOL, METERED RESPIRATORY (INHALATION) at 18:47

## 2025-01-03 RX ADMIN — GABAPENTIN 300 MG: 300 CAPSULE ORAL at 13:35

## 2025-01-03 RX ADMIN — GABAPENTIN 300 MG: 300 CAPSULE ORAL at 10:58

## 2025-01-03 RX ADMIN — BUSPIRONE HYDROCHLORIDE 7.5 MG: 7.5 TABLET ORAL at 21:00

## 2025-01-03 RX ADMIN — INSULIN LISPRO 2 UNITS: 100 INJECTION, SOLUTION INTRAVENOUS; SUBCUTANEOUS at 11:15

## 2025-01-03 RX ADMIN — ISOSORBIDE MONONITRATE 30 MG: 60 TABLET, EXTENDED RELEASE ORAL at 10:59

## 2025-01-03 RX ADMIN — ENOXAPARIN SODIUM 30 MG: 100 INJECTION SUBCUTANEOUS at 21:00

## 2025-01-03 RX ADMIN — BUPROPION HYDROCHLORIDE 150 MG: 150 TABLET, FILM COATED, EXTENDED RELEASE ORAL at 10:58

## 2025-01-03 RX ADMIN — ASPIRIN 81 MG: 81 TABLET, CHEWABLE ORAL at 10:59

## 2025-01-03 RX ADMIN — INSULIN LISPRO 4 UNITS: 100 INJECTION, SOLUTION INTRAVENOUS; SUBCUTANEOUS at 17:35

## 2025-01-03 RX ADMIN — INSULIN LISPRO 2 UNITS: 100 INJECTION, SOLUTION INTRAVENOUS; SUBCUTANEOUS at 02:56

## 2025-01-03 RX ADMIN — BUSPIRONE HYDROCHLORIDE 7.5 MG: 7.5 TABLET ORAL at 10:58

## 2025-01-03 RX ADMIN — BUSPIRONE HYDROCHLORIDE 7.5 MG: 7.5 TABLET ORAL at 13:34

## 2025-01-03 RX ADMIN — LEVOTHYROXINE SODIUM 75 MCG: 0.07 TABLET ORAL at 10:59

## 2025-01-03 RX ADMIN — INSULIN LISPRO 6 UNITS: 100 INJECTION, SOLUTION INTRAVENOUS; SUBCUTANEOUS at 22:15

## 2025-01-03 RX ADMIN — GUAIFENESIN 600 MG: 600 TABLET ORAL at 20:59

## 2025-01-03 RX ADMIN — GUAIFENESIN 600 MG: 600 TABLET ORAL at 10:59

## 2025-01-03 RX ADMIN — ALBUTEROL SULFATE 2 PUFF: 90 AEROSOL, METERED RESPIRATORY (INHALATION) at 06:58

## 2025-01-03 RX ADMIN — OSELTAMIVIR PHOSPHATE 75 MG: 75 CAPSULE ORAL at 20:59

## 2025-01-03 RX ADMIN — DIVALPROEX SODIUM 500 MG: 125 CAPSULE, COATED PELLETS ORAL at 11:16

## 2025-01-03 RX ADMIN — Medication 3 MG: at 20:59

## 2025-01-03 RX ADMIN — SODIUM CHLORIDE: 9 INJECTION, SOLUTION INTRAVENOUS at 11:56

## 2025-01-03 RX ADMIN — OLANZAPINE 10 MG: 5 TABLET, FILM COATED ORAL at 21:00

## 2025-01-03 RX ADMIN — ENOXAPARIN SODIUM 30 MG: 100 INJECTION SUBCUTANEOUS at 10:58

## 2025-01-03 RX ADMIN — OSELTAMIVIR PHOSPHATE 75 MG: 75 CAPSULE ORAL at 10:59

## 2025-01-03 RX ADMIN — ENOXAPARIN SODIUM 30 MG: 100 INJECTION SUBCUTANEOUS at 02:39

## 2025-01-03 RX ADMIN — INSULIN LISPRO 6 UNITS: 100 INJECTION, SOLUTION INTRAVENOUS; SUBCUTANEOUS at 13:34

## 2025-01-03 RX ADMIN — INSULIN GLARGINE 27 UNITS: 100 INJECTION, SOLUTION SUBCUTANEOUS at 20:59

## 2025-01-03 RX ADMIN — SODIUM CHLORIDE: 9 INJECTION, SOLUTION INTRAVENOUS at 22:15

## 2025-01-03 RX ADMIN — ATENOLOL 50 MG: 50 TABLET ORAL at 10:58

## 2025-01-03 RX ADMIN — DIVALPROEX SODIUM 500 MG: 125 CAPSULE, COATED PELLETS ORAL at 20:59

## 2025-01-03 RX ADMIN — SODIUM CHLORIDE, PRESERVATIVE FREE 10 ML: 5 INJECTION INTRAVENOUS at 21:00

## 2025-01-03 RX ADMIN — GABAPENTIN 300 MG: 300 CAPSULE ORAL at 20:59

## 2025-01-03 RX ADMIN — POTASSIUM CHLORIDE 20 MEQ: 1500 TABLET, EXTENDED RELEASE ORAL at 10:59

## 2025-01-03 RX ADMIN — SODIUM CHLORIDE: 9 INJECTION, SOLUTION INTRAVENOUS at 02:39

## 2025-01-03 RX ADMIN — METHYLPREDNISOLONE SODIUM SUCCINATE 40 MG: 40 INJECTION INTRAMUSCULAR; INTRAVENOUS at 11:17

## 2025-01-03 RX ADMIN — ATORVASTATIN CALCIUM 20 MG: 20 TABLET, FILM COATED ORAL at 10:59

## 2025-01-03 RX ADMIN — TIOTROPIUM BROMIDE INHALATION SPRAY 2 PUFF: 3.12 SPRAY, METERED RESPIRATORY (INHALATION) at 06:58

## 2025-01-03 ASSESSMENT — PAIN SCALES - GENERAL: PAINLEVEL_OUTOF10: 0

## 2025-01-03 NOTE — H&P
Hospitalist Group   History and Physical    Attending: Dr. Ruiz    CHIEF COMPLAINT: Generalized weakness    History of Present Illness:  Tanmay Odom is a 64 y.o. male with a PMHx of schizophrenia, major depressive disorder, PE, asthma, DM2, seizures, hyperlipidemia, hypertension, and tobacco abuse per the patient and EMR review, presents with a chief complaint of generalized weakness.  Patient is currently only oriented to self and month.  Disoriented to year, place, and situation.  Only able to provide small parts of HPI/ROS.  Per ED report, patient arrives due to altered mental status after a fall at approximately 2:30 PM at [facility]-slid down to his knees.  Did not hit head/no LOC.  When asked, patient reports that he was \"shaking so bad and just fell.\"  When asked about any other symptoms including lightheadedness, dizziness, etc., he just repeatedly said \"I was shaking so bad.\"  When asked how long he had been feeling weak and was shaking, he said a few days.  He is currently denying chest pain, shortness of breath, nausea, vomiting, diarrhea, headaches, dizziness, and abdominal pain.     REVIEW OF SYSTEMS:  No fevers, chills, cp, sob, n/v, ha, vision/hearing changes, wt changes, hot/cold flashes, other open skin lesions, diarrhea, constipation, dysuria/hematuria unless noted in HPI. Complete ROS performed with the patient and is otherwise negative.    PMH:  Past Medical History:   Diagnosis Date    Anxiety     Asthma     DM (diabetes mellitus) (HCC)     History of pulmonary embolism     History of seizures     Hyperlipidemia     Hypertension     Long term (current) use of anticoagulants 5/12/2015    Schizophrenia (HCC)     Schizophrenia ([facility])     SOB (shortness of breath) 11/17/2016    Tobacco abuse        Surgical History:  Past Surgical History:   Procedure Laterality Date    APPENDECTOMY      BACK SURGERY         Medications Prior to Admission:    Prior to Admission medications    Medication Sig

## 2025-01-03 NOTE — ED NOTES
Paperwork has Pt listed as DNR-CC from June 2023  I spoke to the Pt regarding code status and he confirms that he is a full code.

## 2025-01-04 ENCOUNTER — APPOINTMENT (OUTPATIENT)
Dept: CT IMAGING | Age: 65
DRG: 871 | End: 2025-01-04
Attending: INTERNAL MEDICINE
Payer: MEDICARE

## 2025-01-04 LAB
A BAUMANNII DNA BLD POS QL NAA+NON-PROBE: NOT DETECTED
ALBUMIN SERPL-MCNC: 3.3 G/DL (ref 3.5–4.6)
ALBUMIN SERPL-MCNC: 3.4 G/DL (ref 3.5–4.6)
ALBUMIN SERPL-MCNC: 3.5 G/DL (ref 3.5–4.6)
ALBUMIN SERPL-MCNC: 3.6 G/DL (ref 3.5–4.6)
ALP SERPL-CCNC: 59 U/L (ref 35–104)
ALP SERPL-CCNC: 63 U/L (ref 35–104)
ALP SERPL-CCNC: 64 U/L (ref 35–104)
ALP SERPL-CCNC: 64 U/L (ref 35–104)
ALT SERPL-CCNC: 10 U/L (ref 0–41)
ALT SERPL-CCNC: 11 U/L (ref 0–41)
ALT SERPL-CCNC: 9 U/L (ref 0–41)
ALT SERPL-CCNC: 9 U/L (ref 0–41)
ANION GAP SERPL CALCULATED.3IONS-SCNC: 5 MEQ/L (ref 9–15)
ANION GAP SERPL CALCULATED.3IONS-SCNC: 6 MEQ/L (ref 9–15)
ANION GAP SERPL CALCULATED.3IONS-SCNC: 7 MEQ/L (ref 9–15)
ANION GAP SERPL CALCULATED.3IONS-SCNC: 8 MEQ/L (ref 9–15)
AST SERPL-CCNC: 11 U/L (ref 0–40)
AST SERPL-CCNC: 11 U/L (ref 0–40)
AST SERPL-CCNC: 12 U/L (ref 0–40)
AST SERPL-CCNC: 12 U/L (ref 0–40)
BACTERIA BLD CULT: ABNORMAL
BASOPHILS # BLD: 0 K/UL (ref 0–0.2)
BASOPHILS NFR BLD: 0 %
BILIRUB SERPL-MCNC: <0.2 MG/DL (ref 0.2–0.7)
BUN SERPL-MCNC: 18 MG/DL (ref 8–23)
BUN SERPL-MCNC: 18 MG/DL (ref 8–23)
BUN SERPL-MCNC: 19 MG/DL (ref 8–23)
BUN SERPL-MCNC: 19 MG/DL (ref 8–23)
C ALBICANS DNA BLD POS QL NAA+NON-PROBE: NOT DETECTED
C AURIS DNA BLD POS QL NAA+PROBE: NOT DETECTED
C GLABRATA DNA BLD POS QL NAA+NON-PROBE: NOT DETECTED
C KRUSEI DNA BLD POS QL NAA+NON-PROBE: NOT DETECTED
C PARAP DNA BLD POS QL NAA+NON-PROBE: NOT DETECTED
C TROPICLS DNA BLD POS QL NAA+NON-PROBE: NOT DETECTED
CALCIUM SERPL-MCNC: 8.3 MG/DL (ref 8.5–9.9)
CALCIUM SERPL-MCNC: 8.5 MG/DL (ref 8.5–9.9)
CALCIUM SERPL-MCNC: 8.5 MG/DL (ref 8.5–9.9)
CALCIUM SERPL-MCNC: 8.6 MG/DL (ref 8.5–9.9)
CHLORIDE SERPL-SCNC: 92 MEQ/L (ref 95–107)
CHLORIDE SERPL-SCNC: 93 MEQ/L (ref 95–107)
CHLORIDE SERPL-SCNC: 93 MEQ/L (ref 95–107)
CHLORIDE SERPL-SCNC: 96 MEQ/L (ref 95–107)
CO2 SERPL-SCNC: 29 MEQ/L (ref 20–31)
CO2 SERPL-SCNC: 31 MEQ/L (ref 20–31)
CO2 SERPL-SCNC: 32 MEQ/L (ref 20–31)
CO2 SERPL-SCNC: 32 MEQ/L (ref 20–31)
CREAT SERPL-MCNC: 1.2 MG/DL (ref 0.7–1.2)
CREAT SERPL-MCNC: 1.33 MG/DL (ref 0.7–1.2)
CREAT SERPL-MCNC: 1.34 MG/DL (ref 0.7–1.2)
CREAT SERPL-MCNC: 1.36 MG/DL (ref 0.7–1.2)
CREAT UR-MCNC: 58.7 MG/DL
CRYPTOCOCCUS NEOFORMANS/GATTII BY PCR: NOT DETECTED
E CLOAC COMP DNA BLD POS NAA+NON-PROBE: NOT DETECTED
E COLI DNA BLD POS QL NAA+NON-PROBE: NOT DETECTED
E FAECALIS DNA BLD POS QL NAA+PROBE: NOT DETECTED
E FAECIUM DNA BLD POS QL NAA+PROBE: NOT DETECTED
ENTEROBACT DNA BLD POS QL NAA+NON-PROBE: NOT DETECTED
ENTEROCOC DNA BLD POS QL NAA+NON-PROBE: NOT DETECTED
EOSINOPHIL # BLD: 0 K/UL (ref 0–0.7)
EOSINOPHIL NFR BLD: 0 %
ERYTHROCYTE [DISTWIDTH] IN BLOOD BY AUTOMATED COUNT: 12.9 % (ref 11.5–14.5)
GLOBULIN SER CALC-MCNC: 2.5 G/DL (ref 2.3–3.5)
GLOBULIN SER CALC-MCNC: 2.6 G/DL (ref 2.3–3.5)
GLOBULIN SER CALC-MCNC: 2.7 G/DL (ref 2.3–3.5)
GLOBULIN SER CALC-MCNC: 2.8 G/DL (ref 2.3–3.5)
GLUCOSE BLD-MCNC: 248 MG/DL (ref 70–99)
GLUCOSE BLD-MCNC: 265 MG/DL (ref 70–99)
GLUCOSE BLD-MCNC: 307 MG/DL (ref 70–99)
GLUCOSE BLD-MCNC: 367 MG/DL (ref 70–99)
GLUCOSE SERPL-MCNC: 259 MG/DL (ref 70–99)
GLUCOSE SERPL-MCNC: 276 MG/DL (ref 70–99)
GLUCOSE SERPL-MCNC: 293 MG/DL (ref 70–99)
GLUCOSE SERPL-MCNC: 342 MG/DL (ref 70–99)
GN BLD CULTURE PNL BLD POS NAA+PROBE: NOT DETECTED
GP B STREP DNA BLD POS QL NAA+NON-PROBE: NOT DETECTED
HCT VFR BLD AUTO: 35.3 % (ref 42–52)
HGB BLD-MCNC: 12 G/DL (ref 14–18)
K OXYTOCA DNA BLD POS QL NAA+NON-PROBE: NOT DETECTED
K PNEUMON DNA SPEC QL NAA+PROBE: NOT DETECTED
K. AEROGENES DNA SPEC QL NAA+PROBE: NOT DETECTED
L MONOCYTOG DNA BLD POS QL NAA+NON-PROBE: NOT DETECTED
LYMPHOCYTES # BLD: 1.1 K/UL (ref 1–4.8)
LYMPHOCYTES NFR BLD: 21.4 %
MCH RBC QN AUTO: 32.5 PG (ref 27–31.3)
MCHC RBC AUTO-ENTMCNC: 34 % (ref 33–37)
MCV RBC AUTO: 95.7 FL (ref 79–92.2)
MONOCYTES # BLD: 0.5 K/UL (ref 0.2–0.8)
MONOCYTES NFR BLD: 10.5 %
N MEN DNA BLD POS QL NAA+NON-PROBE: NOT DETECTED
NEUTROPHILS # BLD: 3.5 K/UL (ref 1.4–6.5)
NEUTS SEG NFR BLD: 67.3 %
OSMOLALITY URINE: 445 MOSM/KG (ref 80–1300)
P AERUGINOSA DNA BLD POS NAA+NON-PROBE: NOT DETECTED
PERFORMED ON: ABNORMAL
PLATELET # BLD AUTO: 153 K/UL (ref 130–400)
POTASSIUM SERPL-SCNC: 5.3 MEQ/L (ref 3.4–4.9)
POTASSIUM SERPL-SCNC: 5.4 MEQ/L (ref 3.4–4.9)
POTASSIUM SERPL-SCNC: 5.5 MEQ/L (ref 3.4–4.9)
POTASSIUM SERPL-SCNC: 6.2 MEQ/L (ref 3.4–4.9)
PROT SERPL-MCNC: 6 G/DL (ref 6.3–8)
PROT SERPL-MCNC: 6.1 G/DL (ref 6.3–8)
PROT SERPL-MCNC: 6.1 G/DL (ref 6.3–8)
PROT SERPL-MCNC: 6.2 G/DL (ref 6.3–8)
PROTEUS SP DNA BLD POS QL NAA+NON-PROBE: NOT DETECTED
RBC # BLD AUTO: 3.69 M/UL (ref 4.7–6.1)
S AUREUS DNA BLD POS QL NAA+NON-PROBE: NOT DETECTED
S AUREUS+CONS DNA BLD POS NAA+NON-PROBE: DETECTED
S EPIDERMIDIS DNA BLD POS QL NAA+PROBE: NOT DETECTED
S LUGDUNENSIS DNA BLD POS QL NAA+PROBE: NOT DETECTED
S MALTOPH DNA BLD POS QL NAA+PROBE: NOT DETECTED
S MARCESCENS DNA BLD POS NAA+NON-PROBE: NOT DETECTED
S PNEUM DNA BLD POS QL NAA+NON-PROBE: NOT DETECTED
S PYO DNA BLD POS QL NAA+NON-PROBE: NOT DETECTED
SALMONELLA DNA BLD POS QL NAA+PROBE: NOT DETECTED
SODIUM SERPL-SCNC: 130 MEQ/L (ref 135–144)
SODIUM SERPL-SCNC: 130 MEQ/L (ref 135–144)
SODIUM SERPL-SCNC: 131 MEQ/L (ref 135–144)
SODIUM SERPL-SCNC: 133 MEQ/L (ref 135–144)
SODIUM UR-SCNC: 40 MEQ/L
STREPTOCOCCUS DNA BLD POS NAA+NON-PROBE: NOT DETECTED
TROPONIN, HIGH SENSITIVITY: 16 NG/L (ref 0–19)
TSH SERPL-MCNC: 0.76 UIU/ML (ref 0.44–3.86)
WBC # BLD AUTO: 5.1 K/UL (ref 4.8–10.8)

## 2025-01-04 PROCEDURE — 84443 ASSAY THYROID STIM HORMONE: CPT

## 2025-01-04 PROCEDURE — 6370000000 HC RX 637 (ALT 250 FOR IP): Performed by: INTERNAL MEDICINE

## 2025-01-04 PROCEDURE — 85025 COMPLETE CBC W/AUTO DIFF WBC: CPT

## 2025-01-04 PROCEDURE — 2580000003 HC RX 258

## 2025-01-04 PROCEDURE — 6360000002 HC RX W HCPCS

## 2025-01-04 PROCEDURE — 2700000000 HC OXYGEN THERAPY PER DAY

## 2025-01-04 PROCEDURE — 72192 CT PELVIS W/O DYE: CPT

## 2025-01-04 PROCEDURE — 2500000003 HC RX 250 WO HCPCS

## 2025-01-04 PROCEDURE — 6370000000 HC RX 637 (ALT 250 FOR IP)

## 2025-01-04 PROCEDURE — 1210000000 HC MED SURG R&B

## 2025-01-04 PROCEDURE — 84300 ASSAY OF URINE SODIUM: CPT

## 2025-01-04 PROCEDURE — 94640 AIRWAY INHALATION TREATMENT: CPT

## 2025-01-04 PROCEDURE — 6360000002 HC RX W HCPCS: Performed by: INTERNAL MEDICINE

## 2025-01-04 PROCEDURE — 94761 N-INVAS EAR/PLS OXIMETRY MLT: CPT

## 2025-01-04 PROCEDURE — 2580000003 HC RX 258: Performed by: INTERNAL MEDICINE

## 2025-01-04 PROCEDURE — 99232 SBSQ HOSP IP/OBS MODERATE 35: CPT | Performed by: PSYCHIATRY & NEUROLOGY

## 2025-01-04 PROCEDURE — 86403 PARTICLE AGGLUT ANTBDY SCRN: CPT

## 2025-01-04 PROCEDURE — 84484 ASSAY OF TROPONIN QUANT: CPT

## 2025-01-04 PROCEDURE — 80053 COMPREHEN METABOLIC PANEL: CPT

## 2025-01-04 PROCEDURE — 36415 COLL VENOUS BLD VENIPUNCTURE: CPT

## 2025-01-04 PROCEDURE — 84132 ASSAY OF SERUM POTASSIUM: CPT

## 2025-01-04 PROCEDURE — 82570 ASSAY OF URINE CREATININE: CPT

## 2025-01-04 PROCEDURE — 83935 ASSAY OF URINE OSMOLALITY: CPT

## 2025-01-04 RX ORDER — DEXAMETHASONE 4 MG/1
6 TABLET ORAL DAILY
Status: DISCONTINUED | OUTPATIENT
Start: 2025-01-05 | End: 2025-01-06 | Stop reason: HOSPADM

## 2025-01-04 RX ORDER — ACETAMINOPHEN 160 MG/5ML
650 LIQUID ORAL EVERY 6 HOURS PRN
Status: DISCONTINUED | OUTPATIENT
Start: 2025-01-04 | End: 2025-01-06 | Stop reason: HOSPADM

## 2025-01-04 RX ORDER — INSULIN LISPRO 100 [IU]/ML
5 INJECTION, SOLUTION INTRAVENOUS; SUBCUTANEOUS
Status: DISCONTINUED | OUTPATIENT
Start: 2025-01-04 | End: 2025-01-06 | Stop reason: HOSPADM

## 2025-01-04 RX ORDER — DEXTROSE MONOHYDRATE 25 G/50ML
12.5 INJECTION, SOLUTION INTRAVENOUS ONCE
Status: COMPLETED | OUTPATIENT
Start: 2025-01-04 | End: 2025-01-04

## 2025-01-04 RX ADMIN — BUSPIRONE HYDROCHLORIDE 7.5 MG: 7.5 TABLET ORAL at 09:06

## 2025-01-04 RX ADMIN — SODIUM CHLORIDE, PRESERVATIVE FREE 10 ML: 5 INJECTION INTRAVENOUS at 22:20

## 2025-01-04 RX ADMIN — BUSPIRONE HYDROCHLORIDE 7.5 MG: 7.5 TABLET ORAL at 22:20

## 2025-01-04 RX ADMIN — SODIUM ZIRCONIUM CYCLOSILICATE 10 G: 10 POWDER, FOR SUSPENSION ORAL at 09:07

## 2025-01-04 RX ADMIN — INSULIN LISPRO 4 UNITS: 100 INJECTION, SOLUTION INTRAVENOUS; SUBCUTANEOUS at 22:36

## 2025-01-04 RX ADMIN — SODIUM CHLORIDE, PRESERVATIVE FREE 10 ML: 5 INJECTION INTRAVENOUS at 09:07

## 2025-01-04 RX ADMIN — HUMAN INSULIN 10 UNITS: 100 INJECTION, SOLUTION SUBCUTANEOUS at 04:02

## 2025-01-04 RX ADMIN — BUSPIRONE HYDROCHLORIDE 7.5 MG: 7.5 TABLET ORAL at 13:50

## 2025-01-04 RX ADMIN — OSELTAMIVIR PHOSPHATE 75 MG: 75 CAPSULE ORAL at 22:39

## 2025-01-04 RX ADMIN — TIOTROPIUM BROMIDE INHALATION SPRAY 2 PUFF: 3.12 SPRAY, METERED RESPIRATORY (INHALATION) at 06:56

## 2025-01-04 RX ADMIN — ENOXAPARIN SODIUM 30 MG: 100 INJECTION SUBCUTANEOUS at 09:06

## 2025-01-04 RX ADMIN — BUDESONIDE AND FORMOTEROL FUMARATE DIHYDRATE 2 PUFF: 80; 4.5 AEROSOL RESPIRATORY (INHALATION) at 18:40

## 2025-01-04 RX ADMIN — ALBUTEROL SULFATE 2 PUFF: 90 AEROSOL, METERED RESPIRATORY (INHALATION) at 06:55

## 2025-01-04 RX ADMIN — Medication 400 MG: at 22:19

## 2025-01-04 RX ADMIN — SODIUM ZIRCONIUM CYCLOSILICATE 10 G: 10 POWDER, FOR SUSPENSION ORAL at 22:20

## 2025-01-04 RX ADMIN — GABAPENTIN 300 MG: 300 CAPSULE ORAL at 09:05

## 2025-01-04 RX ADMIN — OSELTAMIVIR PHOSPHATE 75 MG: 75 CAPSULE ORAL at 09:39

## 2025-01-04 RX ADMIN — VANCOMYCIN HYDROCHLORIDE 2500 MG: 1.25 INJECTION, POWDER, LYOPHILIZED, FOR SOLUTION INTRAVENOUS at 13:52

## 2025-01-04 RX ADMIN — Medication 400 MG: at 09:06

## 2025-01-04 RX ADMIN — INSULIN LISPRO 6 UNITS: 100 INJECTION, SOLUTION INTRAVENOUS; SUBCUTANEOUS at 12:57

## 2025-01-04 RX ADMIN — ISOSORBIDE MONONITRATE 30 MG: 60 TABLET, EXTENDED RELEASE ORAL at 09:06

## 2025-01-04 RX ADMIN — Medication 3 MG: at 22:20

## 2025-01-04 RX ADMIN — OLANZAPINE 10 MG: 5 TABLET, FILM COATED ORAL at 22:19

## 2025-01-04 RX ADMIN — GABAPENTIN 300 MG: 300 CAPSULE ORAL at 13:55

## 2025-01-04 RX ADMIN — ACETAMINOPHEN 650 MG: 650 SOLUTION ORAL at 12:56

## 2025-01-04 RX ADMIN — LEVOTHYROXINE SODIUM 75 MCG: 0.07 TABLET ORAL at 09:06

## 2025-01-04 RX ADMIN — INSULIN LISPRO 5 UNITS: 100 INJECTION, SOLUTION INTRAVENOUS; SUBCUTANEOUS at 16:48

## 2025-01-04 RX ADMIN — INSULIN LISPRO 8 UNITS: 100 INJECTION, SOLUTION INTRAVENOUS; SUBCUTANEOUS at 16:48

## 2025-01-04 RX ADMIN — INSULIN GLARGINE 27 UNITS: 100 INJECTION, SOLUTION SUBCUTANEOUS at 22:34

## 2025-01-04 RX ADMIN — INSULIN LISPRO 2 UNITS: 100 INJECTION, SOLUTION INTRAVENOUS; SUBCUTANEOUS at 09:40

## 2025-01-04 RX ADMIN — DIVALPROEX SODIUM 500 MG: 125 CAPSULE, COATED PELLETS ORAL at 22:19

## 2025-01-04 RX ADMIN — BUPROPION HYDROCHLORIDE 150 MG: 150 TABLET, FILM COATED, EXTENDED RELEASE ORAL at 09:39

## 2025-01-04 RX ADMIN — DIVALPROEX SODIUM 500 MG: 125 CAPSULE, COATED PELLETS ORAL at 09:06

## 2025-01-04 RX ADMIN — ENOXAPARIN SODIUM 30 MG: 100 INJECTION SUBCUTANEOUS at 22:20

## 2025-01-04 RX ADMIN — BUDESONIDE AND FORMOTEROL FUMARATE DIHYDRATE 2 PUFF: 80; 4.5 AEROSOL RESPIRATORY (INHALATION) at 06:55

## 2025-01-04 RX ADMIN — SODIUM CHLORIDE: 9 INJECTION, SOLUTION INTRAVENOUS at 09:38

## 2025-01-04 RX ADMIN — GUAIFENESIN 600 MG: 600 TABLET ORAL at 09:06

## 2025-01-04 RX ADMIN — ALBUTEROL SULFATE 2 PUFF: 90 AEROSOL, METERED RESPIRATORY (INHALATION) at 18:40

## 2025-01-04 RX ADMIN — GABAPENTIN 300 MG: 300 CAPSULE ORAL at 22:19

## 2025-01-04 RX ADMIN — METHYLPREDNISOLONE SODIUM SUCCINATE 40 MG: 40 INJECTION INTRAMUSCULAR; INTRAVENOUS at 09:08

## 2025-01-04 RX ADMIN — ATORVASTATIN CALCIUM 20 MG: 20 TABLET, FILM COATED ORAL at 09:06

## 2025-01-04 RX ADMIN — DEXTROSE MONOHYDRATE 12.5 G: 25 INJECTION, SOLUTION INTRAVENOUS at 04:02

## 2025-01-04 RX ADMIN — ATENOLOL 50 MG: 50 TABLET ORAL at 09:07

## 2025-01-04 RX ADMIN — GUAIFENESIN 600 MG: 600 TABLET ORAL at 22:19

## 2025-01-04 RX ADMIN — ASPIRIN 81 MG: 81 TABLET, CHEWABLE ORAL at 09:09

## 2025-01-04 ASSESSMENT — PAIN DESCRIPTION - ORIENTATION
ORIENTATION: RIGHT
ORIENTATION: RIGHT

## 2025-01-04 ASSESSMENT — PAIN DESCRIPTION - DESCRIPTORS
DESCRIPTORS: ACHING
DESCRIPTORS: ACHING

## 2025-01-04 ASSESSMENT — PAIN DESCRIPTION - LOCATION
LOCATION: RIB CAGE
LOCATION: RIB CAGE

## 2025-01-04 ASSESSMENT — PAIN SCALES - GENERAL
PAINLEVEL_OUTOF10: 0
PAINLEVEL_OUTOF10: 0
PAINLEVEL_OUTOF10: 5
PAINLEVEL_OUTOF10: 3

## 2025-01-04 ASSESSMENT — PAIN - FUNCTIONAL ASSESSMENT: PAIN_FUNCTIONAL_ASSESSMENT: ACTIVITIES ARE NOT PREVENTED

## 2025-01-05 LAB
ALBUMIN SERPL-MCNC: 3.3 G/DL (ref 3.5–4.6)
ALBUMIN SERPL-MCNC: 3.3 G/DL (ref 3.5–4.6)
ALBUMIN SERPL-MCNC: 3.5 G/DL (ref 3.5–4.6)
ALBUMIN SERPL-MCNC: 3.6 G/DL (ref 3.5–4.6)
ALP SERPL-CCNC: 58 U/L (ref 35–104)
ALP SERPL-CCNC: 59 U/L (ref 35–104)
ALP SERPL-CCNC: 59 U/L (ref 35–104)
ALP SERPL-CCNC: 61 U/L (ref 35–104)
ALT SERPL-CCNC: 11 U/L (ref 0–41)
ANION GAP SERPL CALCULATED.3IONS-SCNC: 6 MEQ/L (ref 9–15)
ANION GAP SERPL CALCULATED.3IONS-SCNC: 6 MEQ/L (ref 9–15)
ANION GAP SERPL CALCULATED.3IONS-SCNC: 7 MEQ/L (ref 9–15)
ANION GAP SERPL CALCULATED.3IONS-SCNC: 8 MEQ/L (ref 9–15)
AST SERPL-CCNC: 11 U/L (ref 0–40)
BACTERIA SPEC RESP CULT: NORMAL
BASOPHILS # BLD: 0 K/UL (ref 0–0.2)
BASOPHILS NFR BLD: 0.1 %
BILIRUB SERPL-MCNC: <0.2 MG/DL (ref 0.2–0.7)
BUN SERPL-MCNC: 17 MG/DL (ref 8–23)
BUN SERPL-MCNC: 20 MG/DL (ref 8–23)
CALCIUM SERPL-MCNC: 8.5 MG/DL (ref 8.5–9.9)
CALCIUM SERPL-MCNC: 8.5 MG/DL (ref 8.5–9.9)
CALCIUM SERPL-MCNC: 8.6 MG/DL (ref 8.5–9.9)
CALCIUM SERPL-MCNC: 8.8 MG/DL (ref 8.5–9.9)
CHLORIDE SERPL-SCNC: 95 MEQ/L (ref 95–107)
CHLORIDE SERPL-SCNC: 95 MEQ/L (ref 95–107)
CHLORIDE SERPL-SCNC: 96 MEQ/L (ref 95–107)
CHLORIDE SERPL-SCNC: 96 MEQ/L (ref 95–107)
CO2 SERPL-SCNC: 32 MEQ/L (ref 20–31)
CO2 SERPL-SCNC: 34 MEQ/L (ref 20–31)
CO2 SERPL-SCNC: 34 MEQ/L (ref 20–31)
CO2 SERPL-SCNC: 36 MEQ/L (ref 20–31)
CREAT SERPL-MCNC: 1.18 MG/DL (ref 0.7–1.2)
CREAT SERPL-MCNC: 1.21 MG/DL (ref 0.7–1.2)
CREAT SERPL-MCNC: 1.29 MG/DL (ref 0.7–1.2)
CREAT SERPL-MCNC: 1.31 MG/DL (ref 0.7–1.2)
EOSINOPHIL # BLD: 0 K/UL (ref 0–0.7)
EOSINOPHIL NFR BLD: 0 %
ERYTHROCYTE [DISTWIDTH] IN BLOOD BY AUTOMATED COUNT: 13.1 % (ref 11.5–14.5)
GLOBULIN SER CALC-MCNC: 2.4 G/DL (ref 2.3–3.5)
GLOBULIN SER CALC-MCNC: 2.7 G/DL (ref 2.3–3.5)
GLOBULIN SER CALC-MCNC: 2.8 G/DL (ref 2.3–3.5)
GLOBULIN SER CALC-MCNC: 2.8 G/DL (ref 2.3–3.5)
GLUCOSE BLD-MCNC: 206 MG/DL (ref 70–99)
GLUCOSE BLD-MCNC: 246 MG/DL (ref 70–99)
GLUCOSE BLD-MCNC: 257 MG/DL (ref 70–99)
GLUCOSE BLD-MCNC: 325 MG/DL (ref 70–99)
GLUCOSE SERPL-MCNC: 247 MG/DL (ref 70–99)
GLUCOSE SERPL-MCNC: 259 MG/DL (ref 70–99)
GLUCOSE SERPL-MCNC: 265 MG/DL (ref 70–99)
GLUCOSE SERPL-MCNC: 341 MG/DL (ref 70–99)
HCT VFR BLD AUTO: 36.7 % (ref 42–52)
HGB BLD-MCNC: 11.8 G/DL (ref 14–18)
LYMPHOCYTES # BLD: 1.3 K/UL (ref 1–4.8)
LYMPHOCYTES NFR BLD: 17.8 %
MCH RBC QN AUTO: 31.5 PG (ref 27–31.3)
MCHC RBC AUTO-ENTMCNC: 32.2 % (ref 33–37)
MCV RBC AUTO: 97.9 FL (ref 79–92.2)
MONOCYTES # BLD: 0.7 K/UL (ref 0.2–0.8)
MONOCYTES NFR BLD: 9.4 %
NEUTROPHILS # BLD: 5.2 K/UL (ref 1.4–6.5)
NEUTS SEG NFR BLD: 72 %
PERFORMED ON: ABNORMAL
PLATELET # BLD AUTO: 144 K/UL (ref 130–400)
POTASSIUM SERPL-SCNC: 4.8 MEQ/L (ref 3.4–4.9)
POTASSIUM SERPL-SCNC: 4.8 MEQ/L (ref 3.4–4.9)
POTASSIUM SERPL-SCNC: 4.9 MEQ/L (ref 3.4–4.9)
POTASSIUM SERPL-SCNC: 5 MEQ/L (ref 3.4–4.9)
POTASSIUM SERPL-SCNC: 5.2 MEQ/L (ref 3.4–4.9)
PROT SERPL-MCNC: 5.9 G/DL (ref 6.3–8)
PROT SERPL-MCNC: 6 G/DL (ref 6.3–8)
PROT SERPL-MCNC: 6.1 G/DL (ref 6.3–8)
PROT SERPL-MCNC: 6.4 G/DL (ref 6.3–8)
RBC # BLD AUTO: 3.75 M/UL (ref 4.7–6.1)
SODIUM SERPL-SCNC: 134 MEQ/L (ref 135–144)
SODIUM SERPL-SCNC: 136 MEQ/L (ref 135–144)
SODIUM SERPL-SCNC: 137 MEQ/L (ref 135–144)
SODIUM SERPL-SCNC: 138 MEQ/L (ref 135–144)
WBC # BLD AUTO: 7.2 K/UL (ref 4.8–10.8)

## 2025-01-05 PROCEDURE — 80053 COMPREHEN METABOLIC PANEL: CPT

## 2025-01-05 PROCEDURE — 6370000000 HC RX 637 (ALT 250 FOR IP): Performed by: INTERNAL MEDICINE

## 2025-01-05 PROCEDURE — 99222 1ST HOSP IP/OBS MODERATE 55: CPT | Performed by: STUDENT IN AN ORGANIZED HEALTH CARE EDUCATION/TRAINING PROGRAM

## 2025-01-05 PROCEDURE — 6360000002 HC RX W HCPCS: Performed by: INTERNAL MEDICINE

## 2025-01-05 PROCEDURE — 6370000000 HC RX 637 (ALT 250 FOR IP)

## 2025-01-05 PROCEDURE — 2700000000 HC OXYGEN THERAPY PER DAY

## 2025-01-05 PROCEDURE — 99232 SBSQ HOSP IP/OBS MODERATE 35: CPT | Performed by: PSYCHIATRY & NEUROLOGY

## 2025-01-05 PROCEDURE — 94761 N-INVAS EAR/PLS OXIMETRY MLT: CPT

## 2025-01-05 PROCEDURE — 94640 AIRWAY INHALATION TREATMENT: CPT

## 2025-01-05 PROCEDURE — 2500000003 HC RX 250 WO HCPCS

## 2025-01-05 PROCEDURE — 1210000000 HC MED SURG R&B

## 2025-01-05 PROCEDURE — 85025 COMPLETE CBC W/AUTO DIFF WBC: CPT

## 2025-01-05 PROCEDURE — 2580000003 HC RX 258: Performed by: INTERNAL MEDICINE

## 2025-01-05 PROCEDURE — 36415 COLL VENOUS BLD VENIPUNCTURE: CPT

## 2025-01-05 PROCEDURE — 6360000002 HC RX W HCPCS

## 2025-01-05 RX ADMIN — BUSPIRONE HYDROCHLORIDE 7.5 MG: 7.5 TABLET ORAL at 13:25

## 2025-01-05 RX ADMIN — INSULIN LISPRO 6 UNITS: 100 INJECTION, SOLUTION INTRAVENOUS; SUBCUTANEOUS at 21:03

## 2025-01-05 RX ADMIN — ATENOLOL 50 MG: 50 TABLET ORAL at 08:39

## 2025-01-05 RX ADMIN — OSELTAMIVIR PHOSPHATE 75 MG: 75 CAPSULE ORAL at 08:35

## 2025-01-05 RX ADMIN — ENOXAPARIN SODIUM 30 MG: 100 INJECTION SUBCUTANEOUS at 08:35

## 2025-01-05 RX ADMIN — ISOSORBIDE MONONITRATE 30 MG: 60 TABLET, EXTENDED RELEASE ORAL at 08:36

## 2025-01-05 RX ADMIN — BUPROPION HYDROCHLORIDE 150 MG: 150 TABLET, FILM COATED, EXTENDED RELEASE ORAL at 08:39

## 2025-01-05 RX ADMIN — VANCOMYCIN HYDROCHLORIDE 1000 MG: 1 INJECTION, POWDER, LYOPHILIZED, FOR SOLUTION INTRAVENOUS at 01:03

## 2025-01-05 RX ADMIN — SODIUM ZIRCONIUM CYCLOSILICATE 10 G: 10 POWDER, FOR SUSPENSION ORAL at 08:40

## 2025-01-05 RX ADMIN — ENOXAPARIN SODIUM 30 MG: 100 INJECTION SUBCUTANEOUS at 21:03

## 2025-01-05 RX ADMIN — INSULIN LISPRO 5 UNITS: 100 INJECTION, SOLUTION INTRAVENOUS; SUBCUTANEOUS at 12:16

## 2025-01-05 RX ADMIN — SODIUM CHLORIDE, PRESERVATIVE FREE 10 ML: 5 INJECTION INTRAVENOUS at 08:41

## 2025-01-05 RX ADMIN — INSULIN GLARGINE 27 UNITS: 100 INJECTION, SOLUTION SUBCUTANEOUS at 21:03

## 2025-01-05 RX ADMIN — VANCOMYCIN HYDROCHLORIDE 1000 MG: 1 INJECTION, POWDER, LYOPHILIZED, FOR SOLUTION INTRAVENOUS at 12:17

## 2025-01-05 RX ADMIN — DIVALPROEX SODIUM 500 MG: 125 CAPSULE, COATED PELLETS ORAL at 08:39

## 2025-01-05 RX ADMIN — Medication 400 MG: at 08:38

## 2025-01-05 RX ADMIN — BUSPIRONE HYDROCHLORIDE 7.5 MG: 7.5 TABLET ORAL at 21:03

## 2025-01-05 RX ADMIN — BUSPIRONE HYDROCHLORIDE 7.5 MG: 7.5 TABLET ORAL at 08:39

## 2025-01-05 RX ADMIN — TIOTROPIUM BROMIDE INHALATION SPRAY 2 PUFF: 3.12 SPRAY, METERED RESPIRATORY (INHALATION) at 06:52

## 2025-01-05 RX ADMIN — BUDESONIDE AND FORMOTEROL FUMARATE DIHYDRATE 2 PUFF: 80; 4.5 AEROSOL RESPIRATORY (INHALATION) at 19:21

## 2025-01-05 RX ADMIN — ATORVASTATIN CALCIUM 20 MG: 20 TABLET, FILM COATED ORAL at 08:39

## 2025-01-05 RX ADMIN — ALBUTEROL SULFATE 2 PUFF: 90 AEROSOL, METERED RESPIRATORY (INHALATION) at 19:20

## 2025-01-05 RX ADMIN — LEVOTHYROXINE SODIUM 75 MCG: 0.07 TABLET ORAL at 08:38

## 2025-01-05 RX ADMIN — GUAIFENESIN 600 MG: 600 TABLET ORAL at 21:02

## 2025-01-05 RX ADMIN — OSELTAMIVIR PHOSPHATE 75 MG: 75 CAPSULE ORAL at 21:02

## 2025-01-05 RX ADMIN — ALBUTEROL SULFATE 2 PUFF: 90 AEROSOL, METERED RESPIRATORY (INHALATION) at 06:52

## 2025-01-05 RX ADMIN — ASPIRIN 81 MG: 81 TABLET, CHEWABLE ORAL at 08:39

## 2025-01-05 RX ADMIN — GUAIFENESIN 600 MG: 600 TABLET ORAL at 08:39

## 2025-01-05 RX ADMIN — INSULIN LISPRO 2 UNITS: 100 INJECTION, SOLUTION INTRAVENOUS; SUBCUTANEOUS at 08:39

## 2025-01-05 RX ADMIN — DIVALPROEX SODIUM 500 MG: 125 CAPSULE, COATED PELLETS ORAL at 21:02

## 2025-01-05 RX ADMIN — BUDESONIDE AND FORMOTEROL FUMARATE DIHYDRATE 2 PUFF: 80; 4.5 AEROSOL RESPIRATORY (INHALATION) at 06:52

## 2025-01-05 RX ADMIN — GABAPENTIN 300 MG: 300 CAPSULE ORAL at 21:02

## 2025-01-05 RX ADMIN — INSULIN LISPRO 2 UNITS: 100 INJECTION, SOLUTION INTRAVENOUS; SUBCUTANEOUS at 17:21

## 2025-01-05 RX ADMIN — OLANZAPINE 10 MG: 5 TABLET, FILM COATED ORAL at 21:02

## 2025-01-05 RX ADMIN — GABAPENTIN 300 MG: 300 CAPSULE ORAL at 08:38

## 2025-01-05 RX ADMIN — DEXAMETHASONE 6 MG: 4 TABLET ORAL at 08:40

## 2025-01-05 RX ADMIN — Medication 400 MG: at 21:02

## 2025-01-05 RX ADMIN — Medication 3 MG: at 21:02

## 2025-01-05 RX ADMIN — INSULIN LISPRO 4 UNITS: 100 INJECTION, SOLUTION INTRAVENOUS; SUBCUTANEOUS at 12:16

## 2025-01-05 RX ADMIN — SODIUM CHLORIDE, PRESERVATIVE FREE 10 ML: 5 INJECTION INTRAVENOUS at 21:03

## 2025-01-05 RX ADMIN — INSULIN LISPRO 5 UNITS: 100 INJECTION, SOLUTION INTRAVENOUS; SUBCUTANEOUS at 17:21

## 2025-01-05 RX ADMIN — GABAPENTIN 300 MG: 300 CAPSULE ORAL at 13:26

## 2025-01-05 RX ADMIN — INSULIN LISPRO 5 UNITS: 100 INJECTION, SOLUTION INTRAVENOUS; SUBCUTANEOUS at 08:42

## 2025-01-05 ASSESSMENT — PAIN SCALES - GENERAL
PAINLEVEL_OUTOF10: 0

## 2025-01-06 VITALS
OXYGEN SATURATION: 90 % | BODY MASS INDEX: 30.54 KG/M2 | HEART RATE: 80 BPM | DIASTOLIC BLOOD PRESSURE: 88 MMHG | TEMPERATURE: 98.6 F | WEIGHT: 225.5 LBS | HEIGHT: 72 IN | SYSTOLIC BLOOD PRESSURE: 150 MMHG | RESPIRATION RATE: 18 BRPM

## 2025-01-06 LAB
ALBUMIN SERPL-MCNC: 3.5 G/DL (ref 3.5–4.6)
ALP SERPL-CCNC: 60 U/L (ref 35–104)
ALT SERPL-CCNC: 11 U/L (ref 0–41)
ANION GAP SERPL CALCULATED.3IONS-SCNC: 7 MEQ/L (ref 9–15)
AST SERPL-CCNC: 10 U/L (ref 0–40)
BASOPHILS # BLD: 0 K/UL (ref 0–0.2)
BASOPHILS NFR BLD: 0.1 %
BILIRUB SERPL-MCNC: <0.2 MG/DL (ref 0.2–0.7)
BUN SERPL-MCNC: 21 MG/DL (ref 8–23)
CALCIUM SERPL-MCNC: 8.8 MG/DL (ref 8.5–9.9)
CHLORIDE SERPL-SCNC: 96 MEQ/L (ref 95–107)
CO2 SERPL-SCNC: 34 MEQ/L (ref 20–31)
CREAT SERPL-MCNC: 1.26 MG/DL (ref 0.7–1.2)
EOSINOPHIL # BLD: 0 K/UL (ref 0–0.7)
EOSINOPHIL NFR BLD: 0.1 %
ERYTHROCYTE [DISTWIDTH] IN BLOOD BY AUTOMATED COUNT: 13.1 % (ref 11.5–14.5)
GLOBULIN SER CALC-MCNC: 2.6 G/DL (ref 2.3–3.5)
GLUCOSE BLD-MCNC: 206 MG/DL (ref 70–99)
GLUCOSE BLD-MCNC: 212 MG/DL (ref 70–99)
GLUCOSE BLD-MCNC: 249 MG/DL (ref 70–99)
GLUCOSE SERPL-MCNC: 350 MG/DL (ref 70–99)
HCT VFR BLD AUTO: 34.3 % (ref 42–52)
HGB BLD-MCNC: 11.6 G/DL (ref 14–18)
LYMPHOCYTES # BLD: 1.6 K/UL (ref 1–4.8)
LYMPHOCYTES NFR BLD: 23.4 %
MCH RBC QN AUTO: 32.7 PG (ref 27–31.3)
MCHC RBC AUTO-ENTMCNC: 33.8 % (ref 33–37)
MCV RBC AUTO: 96.6 FL (ref 79–92.2)
MONOCYTES # BLD: 0.5 K/UL (ref 0.2–0.8)
MONOCYTES NFR BLD: 8.1 %
NEUTROPHILS # BLD: 4.5 K/UL (ref 1.4–6.5)
NEUTS SEG NFR BLD: 67.4 %
PERFORMED ON: ABNORMAL
PLATELET # BLD AUTO: 162 K/UL (ref 130–400)
POTASSIUM SERPL-SCNC: 4.7 MEQ/L (ref 3.4–4.9)
PROT SERPL-MCNC: 6.1 G/DL (ref 6.3–8)
RBC # BLD AUTO: 3.55 M/UL (ref 4.7–6.1)
SODIUM SERPL-SCNC: 137 MEQ/L (ref 135–144)
WBC # BLD AUTO: 6.7 K/UL (ref 4.8–10.8)

## 2025-01-06 PROCEDURE — 99232 SBSQ HOSP IP/OBS MODERATE 35: CPT | Performed by: NURSE PRACTITIONER

## 2025-01-06 PROCEDURE — 36415 COLL VENOUS BLD VENIPUNCTURE: CPT

## 2025-01-06 PROCEDURE — 6370000000 HC RX 637 (ALT 250 FOR IP): Performed by: INTERNAL MEDICINE

## 2025-01-06 PROCEDURE — 85025 COMPLETE CBC W/AUTO DIFF WBC: CPT

## 2025-01-06 PROCEDURE — 2700000000 HC OXYGEN THERAPY PER DAY

## 2025-01-06 PROCEDURE — 6370000000 HC RX 637 (ALT 250 FOR IP)

## 2025-01-06 PROCEDURE — 2500000003 HC RX 250 WO HCPCS

## 2025-01-06 PROCEDURE — 94640 AIRWAY INHALATION TREATMENT: CPT

## 2025-01-06 PROCEDURE — 6360000002 HC RX W HCPCS: Performed by: INTERNAL MEDICINE

## 2025-01-06 PROCEDURE — 6360000002 HC RX W HCPCS

## 2025-01-06 PROCEDURE — 94761 N-INVAS EAR/PLS OXIMETRY MLT: CPT

## 2025-01-06 PROCEDURE — 80053 COMPREHEN METABOLIC PANEL: CPT

## 2025-01-06 PROCEDURE — 97535 SELF CARE MNGMENT TRAINING: CPT

## 2025-01-06 RX ORDER — DEXAMETHASONE 6 MG/1
6 TABLET ORAL DAILY
DISCHARGE
Start: 2025-01-07 | End: 2025-01-13

## 2025-01-06 RX ORDER — ALBUTEROL SULFATE 90 UG/1
2 INHALANT RESPIRATORY (INHALATION)
Status: DISCONTINUED | OUTPATIENT
Start: 2025-01-07 | End: 2025-01-06 | Stop reason: HOSPADM

## 2025-01-06 RX ORDER — OSELTAMIVIR PHOSPHATE 75 MG/1
75 CAPSULE ORAL 2 TIMES DAILY
DISCHARGE
Start: 2025-01-06 | End: 2025-01-08

## 2025-01-06 RX ORDER — GUAIFENESIN 600 MG/1
600 TABLET, EXTENDED RELEASE ORAL 2 TIMES DAILY
DISCHARGE
Start: 2025-01-06 | End: 2025-01-16

## 2025-01-06 RX ADMIN — BUPROPION HYDROCHLORIDE 150 MG: 150 TABLET, FILM COATED, EXTENDED RELEASE ORAL at 08:27

## 2025-01-06 RX ADMIN — GABAPENTIN 300 MG: 300 CAPSULE ORAL at 08:27

## 2025-01-06 RX ADMIN — BUDESONIDE AND FORMOTEROL FUMARATE DIHYDRATE 2 PUFF: 80; 4.5 AEROSOL RESPIRATORY (INHALATION) at 19:07

## 2025-01-06 RX ADMIN — GABAPENTIN 300 MG: 300 CAPSULE ORAL at 14:27

## 2025-01-06 RX ADMIN — DEXAMETHASONE 6 MG: 4 TABLET ORAL at 08:27

## 2025-01-06 RX ADMIN — BUDESONIDE AND FORMOTEROL FUMARATE DIHYDRATE 2 PUFF: 80; 4.5 AEROSOL RESPIRATORY (INHALATION) at 07:11

## 2025-01-06 RX ADMIN — Medication 400 MG: at 08:27

## 2025-01-06 RX ADMIN — SODIUM CHLORIDE, PRESERVATIVE FREE 10 ML: 5 INJECTION INTRAVENOUS at 08:27

## 2025-01-06 RX ADMIN — INSULIN LISPRO 2 UNITS: 100 INJECTION, SOLUTION INTRAVENOUS; SUBCUTANEOUS at 12:20

## 2025-01-06 RX ADMIN — INSULIN LISPRO 5 UNITS: 100 INJECTION, SOLUTION INTRAVENOUS; SUBCUTANEOUS at 17:36

## 2025-01-06 RX ADMIN — TIOTROPIUM BROMIDE INHALATION SPRAY 2 PUFF: 3.12 SPRAY, METERED RESPIRATORY (INHALATION) at 07:10

## 2025-01-06 RX ADMIN — LEVOTHYROXINE SODIUM 75 MCG: 0.07 TABLET ORAL at 08:27

## 2025-01-06 RX ADMIN — ATORVASTATIN CALCIUM 20 MG: 20 TABLET, FILM COATED ORAL at 11:56

## 2025-01-06 RX ADMIN — INSULIN LISPRO 2 UNITS: 100 INJECTION, SOLUTION INTRAVENOUS; SUBCUTANEOUS at 08:27

## 2025-01-06 RX ADMIN — BUSPIRONE HYDROCHLORIDE 7.5 MG: 7.5 TABLET ORAL at 08:27

## 2025-01-06 RX ADMIN — ISOSORBIDE MONONITRATE 30 MG: 60 TABLET, EXTENDED RELEASE ORAL at 08:27

## 2025-01-06 RX ADMIN — INSULIN LISPRO 2 UNITS: 100 INJECTION, SOLUTION INTRAVENOUS; SUBCUTANEOUS at 17:36

## 2025-01-06 RX ADMIN — ALBUTEROL SULFATE 2 PUFF: 90 AEROSOL, METERED RESPIRATORY (INHALATION) at 07:11

## 2025-01-06 RX ADMIN — BUSPIRONE HYDROCHLORIDE 7.5 MG: 7.5 TABLET ORAL at 14:27

## 2025-01-06 RX ADMIN — ATENOLOL 50 MG: 50 TABLET ORAL at 08:27

## 2025-01-06 RX ADMIN — INSULIN LISPRO 5 UNITS: 100 INJECTION, SOLUTION INTRAVENOUS; SUBCUTANEOUS at 08:24

## 2025-01-06 RX ADMIN — DIVALPROEX SODIUM 500 MG: 125 CAPSULE, COATED PELLETS ORAL at 08:27

## 2025-01-06 RX ADMIN — INSULIN LISPRO 5 UNITS: 100 INJECTION, SOLUTION INTRAVENOUS; SUBCUTANEOUS at 12:19

## 2025-01-06 RX ADMIN — OSELTAMIVIR PHOSPHATE 75 MG: 75 CAPSULE ORAL at 08:27

## 2025-01-06 RX ADMIN — ASPIRIN 81 MG: 81 TABLET, CHEWABLE ORAL at 08:27

## 2025-01-06 RX ADMIN — ENOXAPARIN SODIUM 30 MG: 100 INJECTION SUBCUTANEOUS at 08:27

## 2025-01-06 RX ADMIN — GUAIFENESIN 600 MG: 600 TABLET ORAL at 08:27

## 2025-01-06 RX ADMIN — ALBUTEROL SULFATE 2 PUFF: 90 AEROSOL, METERED RESPIRATORY (INHALATION) at 19:07

## 2025-01-06 ASSESSMENT — ENCOUNTER SYMPTOMS
NAUSEA: 0
CHEST TIGHTNESS: 0
SHORTNESS OF BREATH: 0
VOMITING: 0
COUGH: 0
COLOR CHANGE: 0
WHEEZING: 0
TROUBLE SWALLOWING: 0

## 2025-01-06 NOTE — CARE COORDINATION
PT IS SET FOR  AT 5PM TO RETURN TO [facility].  CAREY AT [facility] IS AWARE OF PT RETURN AND THEY HAVE A ROOM READY FOR HIS RETURN.    
members/significant others, and if so, who? No  Plans to Return to Present Housing: Yes  Other Identified Issues/Barriers to RETURNING to current housing: NO  Potential Assistance needed at discharge: N/A            Potential DME:    Patient expects to discharge to: Assisted living  Plan for transportation at discharge:      Financial    Payor: [employer] / Plan: [employer] / Product Type: *No Product type* /     Does insurance require precert for SNF: No    Potential assistance Purchasing Medications: No  Meds-to-Beds request:        [place], [place] - [address] [phone] - F [phone]  [address] [address]  Phone: [phone] Fax: [phone]      Notes:    Factors facilitating achievement of predicted outcomes: Cooperative    Barriers to discharge: Medical complications    Additional Case Management Notes: PER CAREY WITH [facility], PT IS A LONG TERM BED HOLD. PT CAN RETURN WITH NO PRECERT. ATTEMPTED TO CALL PT IN ROOM DUE TO COVID ISOLATION AND NO ANSWER. DC PLAN BACK TO [facility] WHEN MEDICALLY CLEARED.     The Plan for Transition of Care is related to the following treatment goals of Hyponatremia [E87.1]  Influenza A [J10.1]  Hyperglycemia [R73.9]  General weakness [R53.1]  Fall, initial encounter [W19.XXXA]  COVID-19 virus infection [U07.1]    IF APPLICABLE: The Patient and/or patient representative Tanmay and his family were provided with a choice of provider and agrees with the discharge plan. Freedom of choice list with basic dialogue that supports the patient's individualized plan of care/goals and shares the quality data associated with the providers was provided to:     Patient Representative Name:       The Patient and/or Patient Representative Agree with the Discharge Plan?      Carmenza Lemons RN  Case Management Department

## 2025-01-06 NOTE — DISCHARGE INSTR - COC
Continuity of Care Form    Patient Name: Tanmay Odom   :  1960  MRN:  [id number]    Admit date:  2025  Discharge date:  ***    Code Status Order: Full Code   Advance Directives:   Advance Care Flowsheet Documentation             Admitting Physician:  May Ruiz MD  PCP: Lui Tamayo MD    Discharging Nurse: ***  Discharging Hospital Unit/Room#: W273/W273-01  Discharging Unit Phone Number: ***    Emergency Contact:   Extended Emergency Contact Information  Primary Emergency Contact: FERNANDA MERINO  Home Phone: [phone]  Relation: Other    Past Surgical History:  Past Surgical History:   Procedure Laterality Date    APPENDECTOMY      BACK SURGERY         Immunization History:   Immunization History   Administered Date(s) Administered    COVID-19, MODERNA, (age 12y+), IM, 50mcg/0.5mL 2023    COVID-19, PFIZER Bivalent, DO NOT Dilute, (age 12y+), IM, 30 mcg/0.3 mL 2022    COVID-19, PFIZER PURPLE top, DILUTE for use, (age 12 y+), 30mcg/0.3mL 2021, 2021    COVID-19, PFIZER, (age 12y+), IM, 30mcg/0.3mL 10/12/2024       Active Problems:  Patient Active Problem List   Diagnosis Code    Long term current use of anticoagulant therapy Z79.01    DM (diabetes mellitus) ([facility]) E11.9    Schizophrenia ([facility]) F20.9    Tobacco abuse Z72.0    Anxiety F41.9    History of seizures Z87.898    Asthma J45.909    History of pulmonary embolism Z86.711    SOB (shortness of breath) R06.02    Shortness of breath R06.02    COPD exacerbation ([facility]) J44.1    Pneumonia J18.9    Altered mental status R41.82    Tobacco dependence F17.200    General weakness R53.1    Influenza A J10.1    Hyponatremia E87.1    Fall W19.XXXA       Isolation/Infection:   Isolation            Droplet  Droplet Plus          Patient Infection Status       Infection Onset Added Last Indicated Last Indicated By Review Planned Expiration Resolved Resolved By    Influenza 25 Respiratory Panel, Molecular, with

## 2025-01-06 NOTE — PLAN OF CARE
Problem: Chronic Conditions and Co-morbidities  Goal: Patient's chronic conditions and co-morbidity symptoms are monitored and maintained or improved  1/4/2025 2335 by Shawna Mirza RN  Outcome: Progressing     Problem: Discharge Planning  Goal: Discharge to home or other facility with appropriate resources  1/4/2025 2335 by Shawna Mirza RN  Outcome: Progressing     Problem: Spiritual Struggle  Goal: Verbalizes spiritual struggle  1/4/2025 2335 by Shawna Mirza RN  Outcome: Progressing     Problem: Spiritual Struggle  Goal: Gains new understanding/perception  1/4/2025 2335 by Shawna Mirza RN  Outcome: Progressing     Problem: Spiritual Struggle  Goal: Growing sense of peace/hope  1/4/2025 2335 by Shawna Mirza RN  Outcome: Progressing     Problem: Spiritual Struggle  Goal: Explore resources for additional follow up, post discharge  1/4/2025 2335 by Shawna Mirza RN  Outcome: Progressing     Problem: Safety - Adult  Goal: Free from fall injury  1/4/2025 2335 by Shawna Mirza RN  Outcome: Progressing     Problem: ABCDS Injury Assessment  Goal: Absence of physical injury  1/4/2025 2335 by Shawna Mirza RN  Outcome: Progressing     Problem: Skin/Tissue Integrity  Goal: Absence of new skin breakdown  Description: 1.  Monitor for areas of redness and/or skin breakdown  2.  Assess vascular access sites hourly  3.  Every 4-6 hours minimum:  Change oxygen saturation probe site  4.  Every 4-6 hours:  If on nasal continuous positive airway pressure, respiratory therapy assess nares and determine need for appliance change or resting period.  1/4/2025 2335 by Shawna Mirza RN  Outcome: Progressing     Problem: Neurosensory - Adult  Goal: Achieves stable or improved neurological status  1/4/2025 2335 by Shawna Mirza RN  Outcome: Progressing     Problem: Neurosensory - Adult  Goal: Absence of seizures  1/4/2025 2335 by Shawna Mirza RN  Outcome: Progressing     Problem: 
  Problem: Chronic Conditions and Co-morbidities  Goal: Patient's chronic conditions and co-morbidity symptoms are monitored and maintained or improved  1/5/2025 1039 by Payton Martinez RN  Outcome: Progressing  1/4/2025 2335 by Shawna Mirza RN  Outcome: Progressing     Problem: Discharge Planning  Goal: Discharge to home or other facility with appropriate resources  1/5/2025 1039 by Payton Martinez RN  Outcome: Progressing  1/4/2025 2335 by Shawna Mirza RN  Outcome: Progressing     Problem: Spiritual Struggle  Goal: Verbalizes spiritual struggle  1/5/2025 1039 by Payton Martinez RN  Outcome: Progressing  1/4/2025 2335 by Shawna Mirza RN  Outcome: Progressing  Goal: Gains new understanding/perception  1/5/2025 1039 by Payton Martinez RN  Outcome: Progressing  1/4/2025 2335 by Shawna Mirza RN  Outcome: Progressing  Goal: Growing sense of peace/hope  1/5/2025 1039 by Payotn Martinez RN  Outcome: Progressing  1/4/2025 2335 by Shawna Mirza RN  Outcome: Progressing  Goal: Explore resources for additional follow up, post discharge  1/5/2025 1039 by Payton Martinez RN  Outcome: Progressing  1/4/2025 2335 by Shawna Mirza RN  Outcome: Progressing     Problem: ABCDS Injury Assessment  Goal: Absence of physical injury  1/5/2025 1039 by Payton Martinez RN  Outcome: Progressing  1/4/2025 2335 by Shawna Mirza RN  Outcome: Progressing     Problem: Skin/Tissue Integrity  Goal: Absence of new skin breakdown  Description: 1.  Monitor for areas of redness and/or skin breakdown  2.  Assess vascular access sites hourly  3.  Every 4-6 hours minimum:  Change oxygen saturation probe site  4.  Every 4-6 hours:  If on nasal continuous positive airway pressure, respiratory therapy assess nares and determine need for appliance change or resting period.  1/5/2025 1039 by Payton Martinez RN  Outcome: Progressing  1/4/2025 2335 by Shawna Mirza RN  Outcome: Progressing   
  Problem: Chronic Conditions and Co-morbidities  Goal: Patient's chronic conditions and co-morbidity symptoms are monitored and maintained or improved  1/6/2025 1046 by Payton Martinez RN  Outcome: Progressing  1/6/2025 0118 by Shawna Mirza RN  Outcome: Progressing     Problem: Discharge Planning  Goal: Discharge to home or other facility with appropriate resources  1/6/2025 1046 by Payton Martinez RN  Outcome: Progressing  1/6/2025 0118 by Shawna Mirza RN  Outcome: Progressing     Problem: Spiritual Struggle  Goal: Verbalizes spiritual struggle  1/6/2025 1046 by Payton Martinez RN  Outcome: Progressing  1/6/2025 0118 by Shawna Mirza RN  Outcome: Progressing  Goal: Gains new understanding/perception  1/6/2025 1046 by Payton Martinez RN  Outcome: Progressing  1/6/2025 0118 by Shawna Mirza RN  Outcome: Progressing  Goal: Growing sense of peace/hope  1/6/2025 1046 by Payton Martinez RN  Outcome: Progressing  1/6/2025 0118 by Shawna Mirza RN  Outcome: Progressing  Goal: Explore resources for additional follow up, post discharge  1/6/2025 1046 by Payton Martinez RN  Outcome: Progressing  1/6/2025 0118 by Shawna Mirza RN  Outcome: Progressing     Problem: Safety - Adult  Goal: Free from fall injury  1/6/2025 1046 by Payton Martinez RN  Outcome: Progressing  1/6/2025 0118 by Shawna Mirza RN  Outcome: Progressing     Problem: ABCDS Injury Assessment  Goal: Absence of physical injury  1/6/2025 1046 by Payton Martinez RN  Outcome: Progressing  1/6/2025 0118 by Shawna Mirza RN  Outcome: Progressing     
  Problem: Chronic Conditions and Co-morbidities  Goal: Patient's chronic conditions and co-morbidity symptoms are monitored and maintained or improved  Outcome: Progressing     Problem: Discharge Planning  Goal: Discharge to home or other facility with appropriate resources  Outcome: Progressing  Flowsheets (Taken 1/2/2025 7911)  Discharge to home or other facility with appropriate resources:   Identify barriers to discharge with patient and caregiver   Identify discharge learning needs (meds, wound care, etc)   Refer to discharge planning if patient needs post-hospital services based on physician order or complex needs related to functional status, cognitive ability or social support system   Arrange for needed discharge resources and transportation as appropriate   Arrange for interpreters to assist at discharge as needed     Problem: Spiritual Struggle  Goal: Verbalizes spiritual struggle  Outcome: Progressing  Goal: Gains new understanding/perception  Outcome: Progressing  Goal: Growing sense of peace/hope  Outcome: Progressing  Goal: Explore resources for additional follow up, post discharge  Outcome: Progressing     Problem: Safety - Adult  Goal: Free from fall injury  Outcome: Progressing     Problem: ABCDS Injury Assessment  Goal: Absence of physical injury  Outcome: Progressing     Problem: Skin/Tissue Integrity  Goal: Absence of new skin breakdown  Description: 1.  Monitor for areas of redness and/or skin breakdown  2.  Assess vascular access sites hourly  3.  Every 4-6 hours minimum:  Change oxygen saturation probe site  4.  Every 4-6 hours:  If on nasal continuous positive airway pressure, respiratory therapy assess nares and determine need for appliance change or resting period.  Outcome: Progressing     Problem: Neurosensory - Adult  Goal: Achieves stable or improved neurological status  Outcome: Progressing  Goal: Absence of seizures  Outcome: Progressing  Goal: Remains free of injury related to 
See OT evaluation for all goals and OT POC. Electronically signed by Deisi Gonzalze OTR/L on 1/3/2025 at 12:23 PM    
Therapy evaluation completed.  Please see daily notes and/or progress notes for details related to planned treatment interventions, goals and functional performance.    
Progressing     Problem: Cardiovascular - Adult  Goal: Absence of cardiac dysrhythmias or at baseline  Outcome: Progressing     Problem: Skin/Tissue Integrity - Adult  Goal: Skin integrity remains intact  Outcome: Progressing     Problem: Skin/Tissue Integrity - Adult  Goal: Incisions, wounds, or drain sites healing without S/S of infection  Outcome: Progressing     Problem: Skin/Tissue Integrity - Adult  Goal: Oral mucous membranes remain intact  Outcome: Progressing     Problem: Musculoskeletal - Adult  Goal: Return mobility to safest level of function  Outcome: Progressing     Problem: Gastrointestinal - Adult  Goal: Minimal or absence of nausea and vomiting  Outcome: Progressing     Problem: Genitourinary - Adult  Goal: Absence of urinary retention  Outcome: Progressing     Problem: Metabolic/Fluid and Electrolytes - Adult  Goal: Electrolytes maintained within normal limits  Outcome: Progressing     Problem: Anxiety  Goal: Will report anxiety at manageable levels  Description: INTERVENTIONS:  1. Administer medication as ordered  2. Teach and rehearse alternative coping skills  3. Provide emotional support with 1:1 interaction with staff  Outcome: Progressing     Problem: Confusion  Goal: Confusion, delirium, dementia, or psychosis is improved or at baseline  Description: INTERVENTIONS:  1. Assess for possible contributors to thought disturbance, including medications, impaired vision or hearing, underlying metabolic abnormalities, dehydration, psychiatric diagnoses, and notify attending LIP  2. [place] high risk fall precautions, as indicated  3. Provide frequent short contacts to provide reality reorientation, refocusing and direction  4. Decrease environmental stimuli, including noise as appropriate  5. Monitor and intervene to maintain adequate nutrition, hydration, elimination, sleep and activity  6. If unable to ensure safety without constant attention obtain sitter and review sitter guidelines with

## 2025-01-06 NOTE — DISCHARGE SUMMARY
by mouth daily     lisinopril 2.5 MG tablet  Commonly known as: PRINIVIL;ZESTRIL  Take 1 tablet by mouth daily     MagOx 400 400 (241.3 Mg) MG Tabs tablet  Generic drug: magnesium oxide     melatonin 3 MG Tabs tablet     metFORMIN 500 MG tablet  Commonly known as: GLUCOPHAGE  Take 1 tablet by mouth 2 times daily (with meals)     mometasone 0.1 % cream  Commonly known as: ELOCON     nicotine 21 MG/24HR  Commonly known as: NICODERM CQ  Place 1 patch onto the skin daily     OLANZapine 10 MG tablet  Commonly known as: ZYPREXA     potassium chloride 20 MEQ extended release tablet  Commonly known as: KLOR-CON M     simvastatin 40 MG tablet  Commonly known as: ZOCOR     Trelegy Ellipta 100-62.5-25 MCG/ACT Aepb inhaler  Generic drug: fluticasone-umeclidin-vilant     venlafaxine 75 MG extended release capsule  Commonly known as: EFFEXOR XR  Take 1 capsule by mouth daily 2 TABS Q HS     vitamin D 25 MCG (1000 UT) Tabs tablet  Commonly known as: CHOLECALCIFEROL            STOP taking these medications      ARIPiprazole 15 MG tablet  Commonly known as: ABILIFY     dilTIAZem 30 MG tablet  Commonly known as: CARDIZEM     ipratropium 0.5 mg-albuterol 2.5 mg 0.5-2.5 (3) MG/3ML Soln nebulizer solution  Commonly known as: DUONEB     lovastatin 10 MG tablet  Commonly known as: MEVACOR     paliperidone 9 MG extended release tablet  Commonly known as: INVEGA               Where to Get Your Medications        Information about where to get these medications is not yet available    Ask your nurse or doctor about these medications  dexAMETHasone 6 MG tablet  guaiFENesin 600 MG extended release tablet  oseltamivir 75 MG capsule         Disposition:   If discharged to Home, Any C needs that were indicated and/or required as been addressed and set up by Social Work.     Condition at discharge: good     Activity: activity as tolerated    Total time taken for discharging this patient: 40 minutes. Greater than 70% of time was spent focused

## 2025-01-07 LAB
BACTERIA BLD CULT ORG #2: NORMAL
CULTURE, BLOOD ID SENSITIVITY: ABNORMAL
CULTURE, BLOOD ID SENSITIVITY: ABNORMAL
ORGANISM: ABNORMAL

## 2025-01-07 NOTE — PROGRESS NOTES
01/03/25 0700   RT Protocol   History Pulmonary Disease 2   Respiratory pattern 0   Breath sounds 2   Cough 0   Indications for Bronchodilator Therapy Decreased or absent breath sounds   Bronchodilator Assessment Score 4       
   01/03/25 2000   RT Protocol   History Pulmonary Disease 2   Respiratory pattern 0   Breath sounds 2   Cough 0   Indications for Bronchodilator Therapy Decreased or absent breath sounds   Bronchodilator Assessment Score 4       
   01/04/25 0655   RT Protocol   History Pulmonary Disease 2   Respiratory pattern 0   Breath sounds 2   Cough 0   Indications for Bronchodilator Therapy Decreased or absent breath sounds   Bronchodilator Assessment Score 4       
   01/05/25 0800   RT Protocol   History Pulmonary Disease 2   Respiratory pattern 0   Breath sounds 2   Cough 0   Indications for Bronchodilator Therapy Decreased or absent breath sounds   Bronchodilator Assessment Score 4       
   01/05/25 1900   RT Protocol   History Pulmonary Disease 2   Respiratory pattern 0   Breath sounds 2   Cough 0   Indications for Bronchodilator Therapy Decreased or absent breath sounds   Bronchodilator Assessment Score 4       
   01/06/25 0800   RT Protocol   History Pulmonary Disease 2   Respiratory pattern 0   Breath sounds 2   Cough 0   Indications for Bronchodilator Therapy Decreased or absent breath sounds   Bronchodilator Assessment Score 4       
   01/06/25 1900   RT Protocol   History Pulmonary Disease 2   Respiratory pattern 0   Breath sounds 6   Cough 0   Indications for Bronchodilator Therapy Decreased or absent breath sounds   Bronchodilator Assessment Score 8       
  [facility] Neurology Daily Progress Note  Name: Tanmay Odom  Age: 64 y.o.  Gender: male  CodeStatus: Full Code  Allergies: Acetaminophen  Pcn [Penicillins]    Chief Complaint:Altered Mental Status (After a fall/)    Primary Care Provider: Lui Tamayo MD  InpatientTreatment Team: Treatment Team:   Dario Jackson MD Patel, Dhruv R, MD Nadkarni, Vivek, MD Hipp, Lisa, RN Saravanan, Balaji, MD Hardy, William, MD Salisbury, Mary Jo C Hancock, Tara L, RCP Cullom, Katherine, RN  Admission Date: 1/2/2025      HPI Consulting provider: Shani Lira for change in mental status  Pt seen and examined for neurology consult.  Patient is 64-year-old male with past medical history of anxiety, COPD, diabetes mellitus, pulmonary embolism, seizures, hyperlipidemia, hypertension, schizophrenia, tobacco use who presented to [facility] emergency room on 1/2/2025 via EMS from [facility] due to fall that occurred followed by altered mental status.  Patient had slid down down to his knees from generalized weakness leading to fall.  No head strike reported.  Upon arrival patient was febrile at 100.6 with moist cough.  Patient has had ongoing flulike symptoms for several days.  Slightly hypoxic at 91% on 3 L per nasal cannula which she wears at baseline.     Patient was found to be positive for both COVID and influenza A.     EKG showed normal sinus rhythm at 98 bpm.  CT of the head negative for acute findings.  Chest x-ray with possible right lower lobe opacity possible pleural effusion on the right with faint interstitial opacities in the left lower lung.  Initial lab testing remarkable for sodium of 126, glucose 228, creatinine 1.29, high-sensitivity troponin of 22, ABGs with CO2 of 62, O2 sat of 90%     Patient is currently COVID-positive and on COVID isolation.  Spoke with nurse.  Nurse reports that patient is oriented x 1.  No fevers.  No headaches reported.  No focal deficits.  No seizure 
  Physician Progress Note      PATIENT:               VERONICA BEDOLLA  CSN #:                  [id number]  :                       1960  ADMIT DATE:       2025 6:28 PM  DISCH DATE:  RESPONDING  PROVIDER #:        Dario Jackson MD          QUERY TEXT:    Pt admitted with influenza A, COVID-19 and altered mental status. In ED, oral   T 100.6, , RR 20-37. If possible, please document in the progress notes   and discharge summary if you are evaluating and /or treating any of the   following:    The medical record reflects the following:  Risk Factors: COVID-19, influenza A  Clinical Indicators: T 100.6 orally, , RR 20-37, Lactic acid 1.2,   Lactate 0.86, Procalcitonin 0.08, WBC 5.3/4.4. H&P \"Altered mental   status...Disoriented to place and situation...Likely metabolic 2/2 Influenza   A, COVID, and dehydration\".  Treatment: NS 1L bolus, then at 100 ml/hr, Tamiflu, cultures, droplet plus   isolation    Thank you,  Bebe Aleman   Clinical Documentation Improvement Specialist  W: [phone]  Options provided:  -- Sepsis secondary to COVID-19 and influenza A, present on admission  -- Sepsis secondary to influenza A, present on admission  -- COVID-19 and influenza A without sepsis.  -- Other - I will add my own diagnosis  -- Disagree - Not applicable / Not valid  -- Disagree - Clinically unable to determine / Unknown  -- Refer to Clinical Documentation Reviewer    PROVIDER RESPONSE TEXT:    This patient has sepsis secondary to COVID-10 and influenza A, which was   present on admission.    Query created by: Michael Aleman on 1/3/2025 2:06 PM      Electronically signed by:  Dario Jackson MD 1/3/2025 2:11 PM          
Assessment completed this shift. Alert and oriented x 4. Confused at times and impulsive. Pulled off gown and telemetry.Mumbles at times. Non purposeful movement of mouth noted. K+ 5.5 today. Notified Dr Jackson via [facility] . Dr Acevedo aware. Message sent to ask if he wanted repeat labs. Awaiting reply.  Electronically signed by Lupe Poole RN on 1/4/2025 at 12:18 PM    
Hospitalist Progress Note      PCP: Lui Tamayo MD    Date of Admission: 1/2/2025    Chief Complaint:    Chief Complaint   Patient presents with    Altered Mental Status     After a fall         Subjective:  Patient endorses generalized weakness    Medications:  Reviewed    Infusion Medications    sodium chloride      sodium chloride 100 mL/hr at 01/04/25 0938    dextrose       Scheduled Medications    sodium zirconium cyclosilicate  10 g Oral Daily    vancomycin (VANCOCIN) intermittent dosing (placeholder)   Other RX Placeholder    vancomycin  25 mg/kg IntraVENous Once    Followed by    [START ON 1/5/2025] vancomycin  1,000 mg IntraVENous Q12H    [START ON 1/5/2025] dexAMETHasone  6 mg Oral Daily    sodium chloride flush  5-40 mL IntraVENous 2 times per day    enoxaparin  30 mg SubCUTAneous BID    melatonin  3 mg Oral Nightly    guaiFENesin  600 mg Oral BID    [Held by provider] ipratropium 0.5 mg-albuterol 2.5 mg  1 Dose Inhalation Q4H WA RT    insulin lispro  0-8 Units SubCUTAneous 4x Daily AC & HS    oseltamivir  75 mg Oral BID    [Held by provider] lisinopril  2.5 mg Oral Daily    nicotine  1 patch TransDERmal Daily    aspirin  81 mg Oral Daily    atenolol  50 mg Oral Daily    buPROPion  150 mg Oral Daily    busPIRone  7.5 mg Oral TID    divalproex  500 mg Oral BID    gabapentin  300 mg Oral TID    isosorbide mononitrate  30 mg Oral Daily    insulin glargine  27 Units SubCUTAneous Nightly    levothyroxine  75 mcg Oral Daily    magnesium oxide  400 mg Oral BID    OLANZapine  10 mg Oral Nightly    [Held by provider] potassium chloride  20 mEq Oral Daily    atorvastatin  20 mg Oral Daily    budesonide-formoterol  2 puff Inhalation BID RT    And    tiotropium  2 puff Inhalation Daily RT    albuterol sulfate HFA  2 puff Inhalation BID RT     PRN Meds: acetaminophen, sodium chloride flush, sodium chloride, potassium chloride **OR** potassium alternative oral replacement **OR** potassium chloride, magnesium 
[facility] OCCUPATIONAL THERAPY EVALUATION - ACUTE     NAME: Tanmay Odom  : 1960 (64 y.o.)  MRN: [id number]  CODE STATUS: Full Code  Room: [facility]/73-01    Date of Service: 1/3/2025    Patient Diagnosis(es): Hyponatremia [E87.1]  Influenza A [J10.1]  Hyperglycemia [R73.9]  General weakness [R53.1]  Fall, initial encounter [W19.XXXA]  COVID-19 virus infection [U07.1]   Patient Active Problem List    Diagnosis Date Noted    COPD exacerbation (HCC)     General weakness 2025    Pneumonia 2018    Altered mental status     Tobacco dependence     Shortness of breath 10/28/2018    SOB (shortness of breath) 2016    DM (diabetes mellitus) (HCC)     Schizophrenia (HCC)     Tobacco abuse     Anxiety     History of seizures     Asthma     History of pulmonary embolism     Long term current use of anticoagulant therapy 2015        Past Medical History:   Diagnosis Date    Anxiety     Asthma     DM (diabetes mellitus) (HCC)     History of pulmonary embolism     History of seizures     Hyperlipidemia     Hypertension     Long term (current) use of anticoagulants 2015    Schizophrenia (HCC)     Schizophrenia (HCC)     SOB (shortness of breath) 2016    Tobacco abuse      Past Surgical History:   Procedure Laterality Date    APPENDECTOMY      BACK SURGERY          Restrictions  Restrictions/Precautions: Isolation, Fall Risk  Activity Level: Up as Tolerated   Up as Tolerated       Safety Devices: Safety Devices  Type of Devices: All fall risk precautions in place;Call light within reach;Chair alarm in place;Left in chair;Telesitter in use     Patient's date of birth confirmed: Yes    General:  Chart Reviewed: Yes  Patient assessed for rehabilitation services?: Yes    Subjective  Subjective: Pt alert and attentive, no acute distress       Pain at start of treatment: No    Pain at end of treatment: No    Location:   Description:   Nursing notified: Not Applicable  RN:   Intervention: 
[facility]   Pharmacy Pharmacokinetic Monitoring Service - Vancomycin     Tanmay Odom is a 64 y.o. male starting on vancomycin therapy for bloodstream infection. Pharmacy consulted by Dr. Jackson for monitoring and adjustment.    Target Concentration: Goal AUC/JUSTIN 400-600 mg*hr/L    Additional Antimicrobials: none    Pertinent Laboratory Values:   Wt Readings from Last 1 Encounters:   01/02/25 104.4 kg (230 lb 1.6 oz)     Temp Readings from Last 1 Encounters:   01/04/25 97.7 °F (36.5 °C) (Oral)     Estimated Creatinine Clearance: 80 mL/min (based on SCr of 1.2 mg/dL).  Recent Labs     01/03/25  0539 01/03/25  1143 01/04/25  0506 01/04/25  1108   CREATININE 1.12   < > 1.33* 1.20   BUN 9   < > 19 18   WBC 4.4*  --  5.1  --     < > = values in this interval not displayed.     Procalcitonin: 0.08    Pertinent Cultures:  Culture Date Source Results   01/04 01/04 Blood 1/2  Blood Biofire Staph  Staph   MRSA Nasal Swab: N/A. Non-respiratory infection.    Plan:  Dosing recommendations based on Bayesian software  Start vancomycin 2500 mg loading dose once followed by 1000 mg q12h  Anticipated AUC of 515 and trough concentration of 16.2 at steady state  Renal labs as indicated - currently only ordered for 1/5  Vancomycin concentration ordered for 01/06 @ 0600   Pharmacy will continue to monitor patient and adjust therapy as indicated    Thank you for the consult,  Kendell Ott [facility]  1/4/2025 12:32 PM   
Nephrology Progress Note    Assessment:  Hyponatremia related partly to meds better Na+ 130 mEq today  Influenza A&B  Hyperkalemia  DM type-2  Schizophrenia      Plan: need stat K+  control BP & glucose dose lokelma      Patient Active Problem List:     Long term current use of anticoagulant therapy     DM (diabetes mellitus) (HCC)     Schizophrenia (HCC)     Tobacco abuse     Anxiety     History of seizures     Asthma     History of pulmonary embolism     SOB (shortness of breath)     Shortness of breath     COPD exacerbation (HCC)     Pneumonia     Altered mental status     Tobacco dependence     General weakness     Influenza A     Hyponatremia     Fall      Subjective:  Admit Date: 1/2/2025    Interval History: stable    Medications:  Scheduled Meds:   sodium chloride flush  5-40 mL IntraVENous 2 times per day    enoxaparin  30 mg SubCUTAneous BID    melatonin  3 mg Oral Nightly    guaiFENesin  600 mg Oral BID    [Held by provider] ipratropium 0.5 mg-albuterol 2.5 mg  1 Dose Inhalation Q4H WA RT    insulin lispro  0-8 Units SubCUTAneous 4x Daily AC & HS    methylPREDNISolone  40 mg IntraVENous Daily    oseltamivir  75 mg Oral BID    [Held by provider] lisinopril  2.5 mg Oral Daily    nicotine  1 patch TransDERmal Daily    aspirin  81 mg Oral Daily    atenolol  50 mg Oral Daily    buPROPion  150 mg Oral Daily    busPIRone  7.5 mg Oral TID    divalproex  500 mg Oral BID    gabapentin  300 mg Oral TID    isosorbide mononitrate  30 mg Oral Daily    insulin glargine  27 Units SubCUTAneous Nightly    levothyroxine  75 mcg Oral Daily    magnesium oxide  400 mg Oral BID    OLANZapine  10 mg Oral Nightly    [Held by provider] potassium chloride  20 mEq Oral Daily    atorvastatin  20 mg Oral Daily    budesonide-formoterol  2 puff Inhalation BID RT    And    tiotropium  2 puff Inhalation Daily RT    albuterol sulfate HFA  2 puff Inhalation BID RT     Continuous Infusions:   sodium chloride      sodium chloride 100 mL/hr at 
Nephrology Progress Note  Normal GFR  Assessment:  Hyponatremia resolved  Influenza on admission  Schizophrenia  DM type-2      Plan:sodium issue resolved  combo viral/meds/cause   will follow vitals .labs    Patient Active Problem List:     Long term current use of anticoagulant therapy     DM (diabetes mellitus) (HCC)     Schizophrenia (HCC)     Tobacco abuse     Anxiety     History of seizures     Asthma     History of pulmonary embolism     SOB (shortness of breath)     Shortness of breath     COPD exacerbation (HCC)     Pneumonia     Altered mental status     Tobacco dependence     General weakness     Influenza A     Hyponatremia     Fall      Subjective:  Admit Date: 1/2/2025    Interval History: stable    Medications:  Scheduled Meds:   vancomycin (VANCOCIN) intermittent dosing (placeholder)   Other RX Placeholder    vancomycin  1,000 mg IntraVENous Q12H    dexAMETHasone  6 mg Oral Daily    insulin lispro  5 Units SubCUTAneous TID WC    sodium zirconium cyclosilicate  10 g Oral TID    sodium chloride flush  5-40 mL IntraVENous 2 times per day    enoxaparin  30 mg SubCUTAneous BID    melatonin  3 mg Oral Nightly    guaiFENesin  600 mg Oral BID    [Held by provider] ipratropium 0.5 mg-albuterol 2.5 mg  1 Dose Inhalation Q4H WA RT    insulin lispro  0-8 Units SubCUTAneous 4x Daily AC & HS    oseltamivir  75 mg Oral BID    [Held by provider] lisinopril  2.5 mg Oral Daily    nicotine  1 patch TransDERmal Daily    aspirin  81 mg Oral Daily    atenolol  50 mg Oral Daily    buPROPion  150 mg Oral Daily    busPIRone  7.5 mg Oral TID    divalproex  500 mg Oral BID    gabapentin  300 mg Oral TID    isosorbide mononitrate  30 mg Oral Daily    insulin glargine  27 Units SubCUTAneous Nightly    levothyroxine  75 mcg Oral Daily    magnesium oxide  400 mg Oral BID    OLANZapine  10 mg Oral Nightly    [Held by provider] potassium chloride  20 mEq Oral Daily    atorvastatin  20 mg Oral Daily    budesonide-formoterol  2 puff 
Order received for STAT potassium per Dr Acevedo.  Electronically signed by Lupe Poole RN on 1/4/2025 at 12:34 PM    
Physical Therapy  Facility/Department: [facility] MED SURG W273/W273-01  Physical Therapy Discharge      NAME: Tanmay Odom    : 1960 (64 y.o.)  MRN: [id number]    Account: [id number]  Gender: male      Patient has been discharged from acute care hospital. DC patient from current PT program.      Electronically signed by Ava Mcintyre PT on 25 at 1:01 PM EST      
Physical Therapy Med Surg Initial Assessment  Facility/Department: [facility] 2 ORTHO TELE  Room: [place] [id number]       NAME: Tanmay Odom  : 1960 (64 y.o.)  MRN: [id number]  CODE STATUS: Full Code    Date of Service: 1/3/2025    Patient Diagnosis(es): Hyponatremia [E87.1]  Influenza A [J10.1]  Hyperglycemia [R73.9]  General weakness [R53.1]  Fall, initial encounter [W19.XXXA]  COVID-19 virus infection [U07.1]   Chief Complaint   Patient presents with    Altered Mental Status     After a fall       Patient Active Problem List    Diagnosis Date Noted    COPD exacerbation (HCC)     General weakness 2025    Pneumonia 2018    Altered mental status     Tobacco dependence     Shortness of breath 10/28/2018    SOB (shortness of breath) 2016    DM (diabetes mellitus) (HCC)     Schizophrenia (HCC)     Tobacco abuse     Anxiety     History of seizures     Asthma     History of pulmonary embolism     Long term current use of anticoagulant therapy 2015        Past Medical History:   Diagnosis Date    Anxiety     Asthma     DM (diabetes mellitus) (HCC)     History of pulmonary embolism     History of seizures     Hyperlipidemia     Hypertension     Long term (current) use of anticoagulants 2015    Schizophrenia (HCC)     Schizophrenia (HCC)     SOB (shortness of breath) 2016    Tobacco abuse      Past Surgical History:   Procedure Laterality Date    APPENDECTOMY      BACK SURGERY         Chart Reviewed: Yes  Patient assessed for rehabilitation services?: Yes    Restrictions:  Restrictions/Precautions: Isolation, Fall Risk     SUBJECTIVE:   Subjective: No starting pain.  Pt reports propeling himself in his own manual W/C at [facility].    Pain   No pre or post pain       Prior Level of Function:  Social/Functional History  Lives With: Home care staff  Type of Home: Facility ([facility])  Home Layout: One level  Home Access: Level entry  Bathroom Shower/Tub: Walk-in shower, Shower chair with 
Pt discharged from unit, report given to transport to return to [facility]. No issue noted, all questions answered.   
Renal Progress Note    Assessment and Plan:    63 yo man with psych history with no previous hyponatremia admitted on 1/2/25 with na 126 in setting of covid 19 / PNA.  Urine na 40, urine osmolality 445 c/w SIADH from either meds or lung process.  On psych meds as well as lasix.  Na has normalized.  Bp is ok.      - keep off lasix. Can take prn  - ok for currently psych meds  - will sign off.  F/u prn    Patient Active Problem List:     Long term current use of anticoagulant therapy     DM (diabetes mellitus) (HCC)     Schizophrenia (HCC)     Tobacco abuse     Anxiety     History of seizures     Asthma     History of pulmonary embolism     SOB (shortness of breath)     Shortness of breath     COPD exacerbation (HCC)     Pneumonia     Altered mental status     Tobacco dependence     General weakness     Influenza A     Hyponatremia     Fall      Subjective:   Admit Date: 1/2/2025    Interval History: pt doing well.  In isolation.  Lasb are good.  On steroids.      Medications:   Scheduled Meds:   dexAMETHasone  6 mg Oral Daily    insulin lispro  5 Units SubCUTAneous TID WC    sodium chloride flush  5-40 mL IntraVENous 2 times per day    enoxaparin  30 mg SubCUTAneous BID    melatonin  3 mg Oral Nightly    guaiFENesin  600 mg Oral BID    [Held by provider] ipratropium 0.5 mg-albuterol 2.5 mg  1 Dose Inhalation Q4H WA RT    insulin lispro  0-8 Units SubCUTAneous 4x Daily AC & HS    oseltamivir  75 mg Oral BID    [Held by provider] lisinopril  2.5 mg Oral Daily    nicotine  1 patch TransDERmal Daily    aspirin  81 mg Oral Daily    atenolol  50 mg Oral Daily    buPROPion  150 mg Oral Daily    busPIRone  7.5 mg Oral TID    divalproex  500 mg Oral BID    gabapentin  300 mg Oral TID    isosorbide mononitrate  30 mg Oral Daily    insulin glargine  27 Units SubCUTAneous Nightly    levothyroxine  75 mcg Oral Daily    magnesium oxide  400 mg Oral BID    OLANZapine  10 mg Oral Nightly    [Held by provider] potassium chloride  20 
Spiritual Care Services     Summary of Visit:   responded to consult order, but RN states that patient is only alert to himself and would not be able to respond.   will attempt to  listed on contact list for patient to see if they need any support.    Encounter Summary  Encounter Overview/Reason: Attempted Encounter  Service Provided For: Patient (pt not able to respond, \"alert to himself only\" per RN)  Complexity of Encounter: Low  Begin Time: 0930  End Time : 0932  Total Time Calculated: 2 min         Spiritual Assessment/Intervention/Outcomes:    Assessment: Unable to assess       Care Plan:        will remain available as needed/referred.      Spiritual Care Services   Electronically signed by Chaplain Minesh on 1/5/2025 at 11:03 AM.    To reach a  for emotional and spiritual support, place an EPIC consult request.   If a  is needed immediately, dial “0” and ask to page the on-call .  
Upon entering room, patient is sitting in chair with head slightly forward. He is oriented to self. Disoriented to time, situation, and place. Patient speaks oft with garbled speech. He is noted to have moderate amount of clear nasal drainage. Patient is a setup with meals. Left hand noted to have moderate amount of tremors compared to right hand. Patient took all medications without difficulty. Shortly after this writer left room, C alarm activated. When entering room, he had transfer self to bed and sitting on the edge. Patient oriented to call light and advised to utilize before transfers. Reposition in bed. Call light and alarms implemented.  
assistance  Bed Mobility Comments: Bed flat with use of hand rails; increased time and effort to complete; Trunk lifting assist required.    Transfers  Sit to Stand: Minimal Assistance  Stand to Sit: Minimal Assistance  Bed to Chair: Minimal assistance;Moderate assistance  Comment: vc's for hand placement and technique; Good follow through. WBOS. STS x2    Ambulation  Surface: Level tile  Device: Rolling Walker  Assistance: Minimal assistance;Moderate assistance  Quality of Gait: WBOS, waddle gait patter, slow cadance, downward gaze; vc's for posture and step length. mildly unsteady.  Gait Deviations: Increased KASSI;Decreased step length;Decreased step height;Shuffles  Distance: 2' to chair                   PT Exercises  Dynamic Standing Balance Exercises: weight shifting, slight rotation and trunk flexion - 2 mins          Activity Tolerance  Activity Tolerance: Patient tolerated treatment well          ASSESSMENT   Assessment: Pt stood and ambulated with WW to chair. He was mildly unsteady at first but improved with cuing and time. Pt transferred to chair with WW. Up in chair post session     Discharge Recommendations:  Continue to assess pending progress         Goals  Short Term Goals  Short Term Goal 1: Pt will be able to stand pivot transfer with use of WW S/I.  Short Term Goal 2: Pt will be able to stand >/= 2' with 1 UE support to be able to assist with ADLs with good balance.    PLAN    General Plan: 1 time a day 3-6 times a week  Safety Devices  Type of Devices: All fall risk precautions in place, Call light within reach, Chair alarm in place, Left in chair     AMPAC (6 CLICK) BASIC MOBILITY  AM-PAC Inpatient Mobility Raw Score : 15      Therapy Time   Individual   Time In 1325   Time Out 1344   Minutes 19      Bm/Trsf - 10 mins  Therex 9 min       Barbara Jacob PTA, 01/06/25 at 3:55 PM         Definitions for assistance levels  Independent = pt does not require any physical supervision or assistance 
Possible right lower lung opacity.  Possible pleural effusion on the   right.  Faint interstitial opacities in the left lower lung.  No pneumothorax   seen.      2.  A displaced rib fracture is not definitively seen.  Noted atherosclerotic   disease and prominence of the cardiac silhouette.  Normal pulmonary   vascularity.      RECOMMENDATION:   Please correlate with patient's clinical presentation.  Consider CT scan of   the chest for further evaluation.         XR HIP RIGHT MIN 4VW W PELVIS   Final Result   1.  No acute osseous findings seen about the pelvis nor hips on these images.   Normal overall alignment.      2.  There are sclerotic changes in the right and left femoral heads which   possibly reflect changes related to avascular necrosis.      3.  Mild bilateral hip osteoarthritis.      RECOMMENDATION:   Recommend screening CT scan of the pelvis to definitively exclude fracture   and to further assess the right femoral head regions.             Assessment/Plan:    Influenza A and COVID-19 positive  Continue tamiflu and steroids; decadron     Altered mental status  Neuro consulted for their opinion; likely due to the overall illness; monitor     3.  Hyponatremia  Improving; monitor     4.  DM2  Add preprandial 5U      5.  Elevated troponin  Flat pattern     6. Hypertension  Continue home meds and monitor     7. Facial twitching      -  more prominent today; concerning for TD; psych consult for their opinion; may need med adjustments    8.  ?Avascular necrosis      - outpatient follow up with ortho    Active Hospital Problems    Diagnosis Date Noted    Influenza A [J10.1] 01/03/2025    Hyponatremia [E87.1] 01/03/2025    Fall [W19.XXXA] 01/03/2025    General weakness [R53.1] 01/02/2025        Additional work up or/and treatment plan may be added today or then after based on clinical progression. I am managing a portion of pt care. Some medical issues are handled by other specialists. Additional work up and 
treatment plan may be added today or then after based on clinical progression. I am managing a portion of pt care. Some medical issues are handled by other specialists. Additional work up and treatment should be done in out pt setting by pt PCP and other out pt providers.     In addition to examining and evaluating pt, I spent additional time explaining care, normal and abnormal findings, and treatment plan. All of pt questions were answered. Counseling, diet and education were  provided. Case will be discussed with nursing staff when appropriate. Family will be updated if and when appropriate.      Diet: ADULT DIET; Regular; 4 carb choices (60 gm/meal); No Added Salt (3-4 gm); 1200 ml    Code Status: Full Code    PT/OT Eval     Electronically signed by Dario Jackson MD on 1/4/2025 at 1:55 PM    
stroke\" or \"stroke alert\" been called?->No  Decision Support Exception - unselect if not a suspected or confirmed  emergency medical condition->Emergency Medical Condition (MA)  What reading provider will be dictating this exam?->CRC    FINDINGS:  BRAIN/VENTRICLES: There is no acute intracranial hemorrhage, mass effect or  midline shift.  No abnormal extra-axial fluid collection.  The gray-white  differentiation is maintained without evidence of an acute infarct.  There is  no evidence of hydrocephalus.    There is stable mild dilatation of the ventricles and sulci representing  age-appropriate atrophy. There is stable mild periventricular and subcortical  white matter hypodensity representing age-appropriate small vessel ischemia.    ORBITS: The visualized portion of the orbits demonstrate no acute abnormality.    SINUSES: The visualized paranasal sinuses and mastoid air cells demonstrate  no acute abnormality.    SOFT TISSUES/SKULL:  No acute abnormality of the visualized skull or soft  tissues.    Impression  1. No acute intracranial abnormality.  No sign of closed head injury.  2. Stable mild age-appropriate atrophy and mild small vessel ischemia.  No results found for this or any previous visit.  No results found for this or any previous visit.      Carotid duplex: No results found for this or any previous visit.  No results found for this or any previous visit.  No results found for this or any previous visit.      Echo No results found for this or any previous visit.            Assessment/Plan:  Patient is 64-year-old male with past medical history of anxiety, COPD, diabetes mellitus, pulmonary embolism, seizures, hyperlipidemia, hypertension, schizophrenia, tobacco use who presented to [facility] emergency room on 1/2/2025 via EMS from [facility] due to fall that occurred followed by altered mental status.  Patient had slid down down to his knees from generalized weakness leading 
    1/5/2025:    Patient is a 64-year-old male who does present with an acute confusional state in the setting of COVID and influenza A positivity.  Patient did have CT scan of the head which did not reveal any evidence of any acute bleed no mass lesions or structural lesions no early CT sign changes are seen to suggest any acute ischemia.  Patient does have a nonfocal and nonlateralizing examination at this time making metabolic encephalopathy secondary to underlying infection the most likely cause of his current mental status.  Hyponatremia, hypoxia and acute kidney injury also are likely causative factors as well.  Will continue to monitor his mental status.    1/6/2025:  Acute encephalopathy in the setting of COVID and influenza A  Schizoaffective disorder  Hyponatremia, resolved  Hypoxia, improved  Encephalopathy has improved  Neurology to sign off.  Call with questions or concerns.